# Patient Record
Sex: FEMALE | Race: WHITE | NOT HISPANIC OR LATINO | Employment: OTHER | URBAN - METROPOLITAN AREA
[De-identification: names, ages, dates, MRNs, and addresses within clinical notes are randomized per-mention and may not be internally consistent; named-entity substitution may affect disease eponyms.]

---

## 2017-01-17 DIAGNOSIS — I10 ESSENTIAL HYPERTENSION: ICD-10-CM

## 2017-01-17 RX ORDER — LISINOPRIL 10 MG/1
TABLET ORAL
Qty: 90 TABLET | Refills: 0 | Status: SHIPPED | OUTPATIENT
Start: 2017-01-17 | End: 2017-04-17 | Stop reason: SDUPTHER

## 2017-03-14 ENCOUNTER — TELEPHONE (OUTPATIENT)
Dept: RHEUMATOLOGY | Facility: CLINIC | Age: 82
End: 2017-03-14

## 2017-03-14 DIAGNOSIS — M81.0 OSTEOPOROSIS: Primary | ICD-10-CM

## 2017-03-14 NOTE — TELEPHONE ENCOUNTER
----- Message from Jordin Anthony LPN sent at 10/26/2016  8:46 AM CDT -----  Last prolia- 10.13.16

## 2017-03-31 ENCOUNTER — PATIENT OUTREACH (OUTPATIENT)
Dept: ADMINISTRATIVE | Facility: HOSPITAL | Age: 82
End: 2017-03-31

## 2017-04-13 ENCOUNTER — OFFICE VISIT (OUTPATIENT)
Dept: INTERNAL MEDICINE | Facility: CLINIC | Age: 82
End: 2017-04-13
Payer: MEDICARE

## 2017-04-13 ENCOUNTER — CLINICAL SUPPORT (OUTPATIENT)
Dept: CARDIOLOGY | Facility: CLINIC | Age: 82
End: 2017-04-13
Payer: MEDICARE

## 2017-04-13 VITALS
HEART RATE: 66 BPM | HEIGHT: 58 IN | OXYGEN SATURATION: 98 % | WEIGHT: 133.19 LBS | DIASTOLIC BLOOD PRESSURE: 80 MMHG | BODY MASS INDEX: 27.96 KG/M2 | TEMPERATURE: 96 F | SYSTOLIC BLOOD PRESSURE: 124 MMHG

## 2017-04-13 DIAGNOSIS — I10 ESSENTIAL HYPERTENSION: ICD-10-CM

## 2017-04-13 DIAGNOSIS — K21.9 GASTROESOPHAGEAL REFLUX DISEASE, ESOPHAGITIS PRESENCE NOT SPECIFIED: ICD-10-CM

## 2017-04-13 DIAGNOSIS — M17.0 PRIMARY OSTEOARTHRITIS OF BOTH KNEES: ICD-10-CM

## 2017-04-13 DIAGNOSIS — Z12.11 COLON CANCER SCREENING: ICD-10-CM

## 2017-04-13 DIAGNOSIS — H91.90 HEARING LOSS, UNSPECIFIED HEARING LOSS TYPE, UNSPECIFIED LATERALITY: ICD-10-CM

## 2017-04-13 DIAGNOSIS — M81.0 OSTEOPOROSIS, UNSPECIFIED OSTEOPOROSIS TYPE, UNSPECIFIED PATHOLOGICAL FRACTURE PRESENCE: ICD-10-CM

## 2017-04-13 DIAGNOSIS — Z87.81 HISTORY OF VERTEBRAL COMPRESSION FRACTURE: ICD-10-CM

## 2017-04-13 DIAGNOSIS — N18.30 CKD (CHRONIC KIDNEY DISEASE) STAGE 3, GFR 30-59 ML/MIN: ICD-10-CM

## 2017-04-13 DIAGNOSIS — Z00.00 ROUTINE GENERAL MEDICAL EXAMINATION AT A HEALTH CARE FACILITY: Primary | ICD-10-CM

## 2017-04-13 PROCEDURE — 99499 UNLISTED E&M SERVICE: CPT | Mod: S$GLB,,, | Performed by: FAMILY MEDICINE

## 2017-04-13 PROCEDURE — 99397 PER PM REEVAL EST PAT 65+ YR: CPT | Mod: S$GLB,,, | Performed by: FAMILY MEDICINE

## 2017-04-13 PROCEDURE — 99999 PR PBB SHADOW E&M-EST. PATIENT-LVL III: CPT | Mod: PBBFAC,,, | Performed by: FAMILY MEDICINE

## 2017-04-13 PROCEDURE — 93000 ELECTROCARDIOGRAM COMPLETE: CPT | Mod: S$GLB,,, | Performed by: INTERNAL MEDICINE

## 2017-04-13 RX ORDER — OMEPRAZOLE 40 MG/1
40 CAPSULE, DELAYED RELEASE ORAL DAILY
COMMUNITY
End: 2018-04-03 | Stop reason: SDUPTHER

## 2017-04-13 RX ORDER — SODIUM, POTASSIUM,MAG SULFATES 17.5-3.13G
SOLUTION, RECONSTITUTED, ORAL ORAL
Qty: 354 ML | Refills: 0 | Status: SHIPPED | OUTPATIENT
Start: 2017-04-13 | End: 2018-08-27

## 2017-04-13 NOTE — MR AVS SNAPSHOT
[facility]  [address]  [place] [address]  Phone: [phone]  Fax: [phone]                  Vivienne Sexton   2017 10:40 AM   Office Visit    Description:  Female : 1929   Provider:  Aisha Hickman MD   Department:  [facility]           Reason for Visit     Follow-up           Diagnoses this Visit        Comments    Routine general medical examination at a health care facility    -  Primary     Essential hypertension         CKD (chronic kidney disease) stage 3, GFR 30-59 ml/min         Osteoporosis, unspecified osteoporosis type, unspecified pathological fracture presence         History of vertebral compression fracture         Primary osteoarthritis of both knees         Hearing loss, unspecified hearing loss type, unspecified laterality         Gastroesophageal reflux disease, esophagitis presence not specified         Colon cancer screening                To Do List           Future Appointments        Provider Department Dept Phone    4/15/2017 1:20 PM LABORATORY, [facility] [phone]    4/15/2017 1:30 PM SPECIMEN, [facility] [phone]    2017 10:30 AM LABORATORY, [facility] [phone]    2017 11:00 AM Mark Merritt MD [facility] Rheumatology [phone]    10/13/2017 8:20 AM Aisha Hickman MD [facility] Internal Medicine [phone]      Your Future Surgeries/Procedures     May 03, 2017   Surgery with First Available [place] [facility]  ([facility])    [address]  [place] [address] [phone]              Goals (5 Years of Data)     None      Follow-Up and Disposition     Return in about 6 months (around 10/13/2017), or if symptoms worsen or fail to improve.       These Medications        Disp Refills Start End    sodium,potassium,mag sulfates (SUPREP BOWEL PREP KIT)  17.5-3.13-1.6 gram SolR 354 mL 0 4/13/2017     Take it as directed    Pharmacy: [facility] - [place], [place] - [address] [facility] [address] Ph #: [phone]         [facility]     [facility] Nurse Care Line - 24/7 Assistance  Unless otherwise directed by your provider, please contact [facility], our nurse care line that is available for 24/7 assistance.     Registered nurses in the [facility] provide: appointment scheduling, clinical advisement, health education, and other advisory services.  Call: [phone] (toll free)               Medications           Message regarding Medications     Verify the changes and/or additions to your medication regime listed below are the same as discussed with your clinician today.  If any of these changes or additions are incorrect, please notify your healthcare provider.        START taking these NEW medications        Refills    sodium,potassium,mag sulfates (SUPREP BOWEL PREP KIT) 17.5-3.13-1.6 gram SolR 0    Sig: Take it as directed    Class: Normal           Verify that the below list of medications is an accurate representation of the medications you are currently taking.  If none reported, the list may be blank. If incorrect, please contact your healthcare provider. Carry this list with you in case of emergency.           Current Medications     lisinopril 10 MG tablet TAKE ONE TABLET BY MOUTH ONCE DAILY    MULTIVITS,CA,MINERALS/IRON/FA (ONE-A-DAY WOMENS FORMULA ORAL) Take by mouth once daily.    omeprazole (PRILOSEC) 40 MG capsule Take 40 mg by mouth once daily.    triamterene-hydrochlorothiazide 37.5-25 mg (MAXZIDE-25) 37.5-25 mg per tablet Take 1 tablet by mouth once daily.    turmeric root extract 500 mg Cap Take 1 capsule by mouth 2 (two) times daily.    sodium,potassium,mag sulfates (SUPREP BOWEL PREP KIT) 17.5-3.13-1.6 gram SolR Take it as directed           Clinical Reference Information           Your Vitals Were     BP Pulse  "Temp Height Weight SpO2    124/80 66 96.3 °F (35.7 °C) (Tympanic) 4' 10" (1.473 m) 60.4 kg (133 lb 2.5 oz) 98%    BMI                27.83 kg/m2          Blood Pressure          Most Recent Value    BP  124/80      Allergies as of 4/13/2017     No Known Allergies      Immunizations Administered on Date of Encounter - 4/13/2017     None      Orders Placed During Today's Visit      Normal Orders This Visit    Case request GI: COLONOSCOPY     Future Labs/Procedures Expected by Expires    CBC auto differential  4/13/2017 6/12/2018    Comprehensive metabolic panel  4/13/2017 6/12/2018    Lipid panel  4/13/2017 6/12/2018    TSH  4/13/2017 6/12/2018    Urinalysis  4/13/2017 6/12/2018    Vitamin D  4/13/2017 6/12/2018    EKG 12-lead  As directed 4/13/2018      [facility] Sign-Up     Activating your [facility] account is as easy as 1-2-3!     1) Visit [email or web address], select Sign Up Now, enter this activation code and your date of birth, then select Next.  [email or web address]  Expires: 5/28/2017 11:00 AM      2) Create a username and password to use when you visit [facility] in the future and select a security question in case you lose your password and select Next.    3) Enter your e-mail address and click Sign Up!    Additional Information  If you have questions, please e-mail [email or web address] or call [phone] to talk to our [facility] staff. Remember, [facility] is NOT to be used for urgent needs. For medical emergencies, dial 911.         Language Assistance Services     ATTENTION: Language assistance services are available, free of charge. Please call [phone].      ATENCIÓN: Si habla español, tiene a michelle disposición servicios gratuitos de asistencia lingüística. Llame al [phone].     CHÚ Ý: N?u b?n nói Ti?ng Vi?t, có các d?ch v? h? tr? ngôn ng? mi?n phí dành cho b?n. G?i s? [phone].         [facility] complies with applicable Federal civil rights laws and does not discriminate on the " basis of race, color, national origin, age, disability, or sex.

## 2017-04-15 ENCOUNTER — LAB VISIT (OUTPATIENT)
Dept: LAB | Facility: HOSPITAL | Age: 82
End: 2017-04-15
Attending: FAMILY MEDICINE
Payer: MEDICARE

## 2017-04-15 DIAGNOSIS — M81.0 OSTEOPOROSIS, UNSPECIFIED OSTEOPOROSIS TYPE, UNSPECIFIED PATHOLOGICAL FRACTURE PRESENCE: ICD-10-CM

## 2017-04-15 DIAGNOSIS — N18.30 CKD (CHRONIC KIDNEY DISEASE) STAGE 3, GFR 30-59 ML/MIN: ICD-10-CM

## 2017-04-15 DIAGNOSIS — I10 ESSENTIAL HYPERTENSION: ICD-10-CM

## 2017-04-15 LAB
25(OH)D3+25(OH)D2 SERPL-MCNC: 35 NG/ML
ALBUMIN SERPL BCP-MCNC: 4.1 G/DL
ALP SERPL-CCNC: 40 U/L
ALT SERPL W/O P-5'-P-CCNC: 14 U/L
ANION GAP SERPL CALC-SCNC: 9 MMOL/L
AST SERPL-CCNC: 22 U/L
BASOPHILS # BLD AUTO: 0.03 K/UL
BASOPHILS NFR BLD: 0.7 %
BILIRUB SERPL-MCNC: 0.5 MG/DL
BUN SERPL-MCNC: 49 MG/DL
CALCIUM SERPL-MCNC: 9.8 MG/DL
CHLORIDE SERPL-SCNC: 108 MMOL/L
CHOLEST/HDLC SERPL: 2.8 {RATIO}
CO2 SERPL-SCNC: 23 MMOL/L
CREAT SERPL-MCNC: 1.6 MG/DL
DIFFERENTIAL METHOD: ABNORMAL
EOSINOPHIL # BLD AUTO: 0.1 K/UL
EOSINOPHIL NFR BLD: 2.9 %
ERYTHROCYTE [DISTWIDTH] IN BLOOD BY AUTOMATED COUNT: 13 %
EST. GFR  (AFRICAN AMERICAN): 32.9 ML/MIN/1.73 M^2
EST. GFR  (NON AFRICAN AMERICAN): 28.6 ML/MIN/1.73 M^2
GLUCOSE SERPL-MCNC: 90 MG/DL
HCT VFR BLD AUTO: 35.2 %
HDL/CHOLESTEROL RATIO: 36 %
HDLC SERPL-MCNC: 250 MG/DL
HDLC SERPL-MCNC: 90 MG/DL
HGB BLD-MCNC: 12.1 G/DL
LDLC SERPL CALC-MCNC: 133.6 MG/DL
LYMPHOCYTES # BLD AUTO: 2 K/UL
LYMPHOCYTES NFR BLD: 48.4 %
MCH RBC QN AUTO: 30.1 PG
MCHC RBC AUTO-ENTMCNC: 34.4 %
MCV RBC AUTO: 88 FL
MONOCYTES # BLD AUTO: 0.4 K/UL
MONOCYTES NFR BLD: 10 %
NEUTROPHILS # BLD AUTO: 1.6 K/UL
NEUTROPHILS NFR BLD: 38 %
NONHDLC SERPL-MCNC: 160 MG/DL
PLATELET # BLD AUTO: 241 K/UL
PMV BLD AUTO: 11 FL
POTASSIUM SERPL-SCNC: 4.6 MMOL/L
PROT SERPL-MCNC: 7.6 G/DL
RBC # BLD AUTO: 4.02 M/UL
SODIUM SERPL-SCNC: 140 MMOL/L
TRIGL SERPL-MCNC: 132 MG/DL
TSH SERPL DL<=0.005 MIU/L-ACNC: 1.7 UIU/ML
WBC # BLD AUTO: 4.19 K/UL

## 2017-04-15 PROCEDURE — 82306 VITAMIN D 25 HYDROXY: CPT

## 2017-04-15 PROCEDURE — 80053 COMPREHEN METABOLIC PANEL: CPT

## 2017-04-15 PROCEDURE — 84443 ASSAY THYROID STIM HORMONE: CPT

## 2017-04-15 PROCEDURE — 80061 LIPID PANEL: CPT

## 2017-04-15 PROCEDURE — 36415 COLL VENOUS BLD VENIPUNCTURE: CPT | Mod: PO

## 2017-04-15 PROCEDURE — 85025 COMPLETE CBC W/AUTO DIFF WBC: CPT

## 2017-04-17 ENCOUNTER — TELEPHONE (OUTPATIENT)
Dept: RHEUMATOLOGY | Facility: CLINIC | Age: 82
End: 2017-04-17

## 2017-04-17 DIAGNOSIS — R82.90 ABNORMAL URINE FINDINGS: Primary | ICD-10-CM

## 2017-04-17 DIAGNOSIS — N18.3 CHRONIC KIDNEY DISEASE (CKD), STAGE 3 (MODERATE): ICD-10-CM

## 2017-04-17 DIAGNOSIS — I10 ESSENTIAL HYPERTENSION: ICD-10-CM

## 2017-04-17 RX ORDER — LISINOPRIL 10 MG/1
10 TABLET ORAL DAILY
Qty: 90 TABLET | Refills: 2 | Status: SHIPPED | OUTPATIENT
Start: 2017-04-17 | End: 2018-01-28 | Stop reason: SDUPTHER

## 2017-04-17 NOTE — TELEPHONE ENCOUNTER
----- Message from Michelle Bryant sent at 4/17/2017  8:56 AM CDT -----  Contact: Pt son - kerry   States he is calling rg wanting to resched the appt that pt has and be worked in for next week due to son going out of town and can be reached at [phone]//laya/dbw

## 2017-04-17 NOTE — TELEPHONE ENCOUNTER
Noted white blood cells with clumps in the urine, will plan to repeat urine test again on Friday.   Noted elevated kidney functions, showing chronic kidney disease stage III, will plan to repeat BMP on Friday, orders placed.  Advised to drink adequate fluids  Otherwise stable labs noted

## 2017-04-17 NOTE — TELEPHONE ENCOUNTER
----- Message from Kye Mclain sent at 4/17/2017  1:43 PM CDT -----  Contact: [phone]-[phone]   Pt would like refills called in on Rx medication linsinopril 10 mg.  Please call back @ [phone].  Thanks-[facility]         Pt uses:  [id number]  [place] - [address] [address]  [address] [place] [address]  [place] [address]  Phone: [phone] Fax: [phone]

## 2017-04-18 ENCOUNTER — TELEPHONE (OUTPATIENT)
Dept: INTERNAL MEDICINE | Facility: CLINIC | Age: 82
End: 2017-04-18

## 2017-04-18 NOTE — TELEPHONE ENCOUNTER
Spoke with Julius and moved labs and apt with Dr. JESSICA to 5.2.17 at 2.30 pm and 3 pm. Verbalized understanding.

## 2017-04-18 NOTE — TELEPHONE ENCOUNTER
----- Message from Amarilys Mckeon sent at 4/18/2017  9:40 AM CDT -----  Pt daughter in law(Carine) at [phone]//states she is returning your call from yesterday afternoon//4/17/17//please call again//thanks/wallace

## 2017-04-19 ENCOUNTER — TELEPHONE (OUTPATIENT)
Dept: GASTROENTEROLOGY | Facility: CLINIC | Age: 82
End: 2017-04-19

## 2017-04-19 NOTE — TELEPHONE ENCOUNTER
Pt is scheduled for a colonoscopy on 5/9/2017.  Pt was last seen by  PCP on 4/13/2017. Pt is 89 y/o.  Please review and advise

## 2017-04-20 NOTE — TELEPHONE ENCOUNTER
I believe that due to age, she should not have a repeat colonoscopy.  She had a simple 4mm adenoma in 2011.  I believe that this test should be cancelled.  I believe that when Dr. Levy recommended a recheck, her age was not taken into account.  Unless if Dr. Hickman feels otherwise, I believe that we can cancel the case.  Will you connect with him?  Dr. Meyer

## 2017-05-02 ENCOUNTER — OFFICE VISIT (OUTPATIENT)
Dept: RHEUMATOLOGY | Facility: CLINIC | Age: 82
End: 2017-05-02
Payer: MEDICARE

## 2017-05-02 ENCOUNTER — LAB VISIT (OUTPATIENT)
Dept: LAB | Facility: HOSPITAL | Age: 82
End: 2017-05-02
Attending: INTERNAL MEDICINE
Payer: MEDICARE

## 2017-05-02 VITALS
DIASTOLIC BLOOD PRESSURE: 64 MMHG | WEIGHT: 135.38 LBS | BODY MASS INDEX: 28.29 KG/M2 | HEART RATE: 68 BPM | SYSTOLIC BLOOD PRESSURE: 130 MMHG

## 2017-05-02 DIAGNOSIS — M81.8 OTHER OSTEOPOROSIS WITHOUT CURRENT PATHOLOGICAL FRACTURE: Primary | ICD-10-CM

## 2017-05-02 DIAGNOSIS — M17.0 PRIMARY OSTEOARTHRITIS OF BOTH KNEES: ICD-10-CM

## 2017-05-02 DIAGNOSIS — M81.0 OSTEOPOROSIS: ICD-10-CM

## 2017-05-02 LAB
ALBUMIN SERPL BCP-MCNC: 4 G/DL
ALP SERPL-CCNC: 43 U/L
ALT SERPL W/O P-5'-P-CCNC: 13 U/L
ANION GAP SERPL CALC-SCNC: 7 MMOL/L
AST SERPL-CCNC: 19 U/L
BILIRUB SERPL-MCNC: 0.3 MG/DL
BUN SERPL-MCNC: 38 MG/DL
CALCIUM SERPL-MCNC: 9.9 MG/DL
CHLORIDE SERPL-SCNC: 110 MMOL/L
CO2 SERPL-SCNC: 26 MMOL/L
CREAT SERPL-MCNC: 1.5 MG/DL
EST. GFR  (AFRICAN AMERICAN): 36 ML/MIN/1.73 M^2
EST. GFR  (NON AFRICAN AMERICAN): 31 ML/MIN/1.73 M^2
GLUCOSE SERPL-MCNC: 98 MG/DL
POTASSIUM SERPL-SCNC: 4.7 MMOL/L
PROT SERPL-MCNC: 7.2 G/DL
SODIUM SERPL-SCNC: 143 MMOL/L

## 2017-05-02 PROCEDURE — 99999 PR PBB SHADOW E&M-EST. PATIENT-LVL III: CPT | Mod: PBBFAC,,, | Performed by: INTERNAL MEDICINE

## 2017-05-02 PROCEDURE — 1160F RVW MEDS BY RX/DR IN RCRD: CPT | Mod: S$GLB,,, | Performed by: INTERNAL MEDICINE

## 2017-05-02 PROCEDURE — 96372 THER/PROPH/DIAG INJ SC/IM: CPT | Mod: S$GLB,,, | Performed by: INTERNAL MEDICINE

## 2017-05-02 PROCEDURE — 99499 UNLISTED E&M SERVICE: CPT | Mod: S$GLB,,, | Performed by: INTERNAL MEDICINE

## 2017-05-02 PROCEDURE — 99213 OFFICE O/P EST LOW 20 MIN: CPT | Mod: 25,S$GLB,, | Performed by: INTERNAL MEDICINE

## 2017-05-02 PROCEDURE — 1125F AMNT PAIN NOTED PAIN PRSNT: CPT | Mod: S$GLB,,, | Performed by: INTERNAL MEDICINE

## 2017-05-02 PROCEDURE — 1159F MED LIST DOCD IN RCRD: CPT | Mod: S$GLB,,, | Performed by: INTERNAL MEDICINE

## 2017-05-02 NOTE — PROGRESS NOTES
Administered 1cc Prolia 60mg/cc to LLQ of abdomen. Pt. Tolerated well. No acute reaction noted to site. Pt. Instructed on S/S of reaction to report. Pt. Verbalized understanding.    Lot:[id number]  Exp:06/19  Manu:[email or web address]

## 2017-05-02 NOTE — PROGRESS NOTES
RHEUMATOLOGY CLINIC FOLLOW UP VISIT  Chief complaints:-  To follow up for osteoporosis.     HPI:-  Vivienne Pettit a 88 y.o. pleasant female comes in for a follow up visit with above chief complaints.  She was diagnosed with osteoporosis after compression fractures of her spine status post kyphoplasty .  She was seen in the rheumatology clinic in 2014 by one of my associates and was lost to follow-up.  She has chronic kidney disease. So, she was started on prolia therapy which she is tolerating well without any problems. She denies any jaw pain.  No plans to have dental procedures done in the next few months.  No new fractures. She complains of intermittent pain over her knees- activity related, not associated with swelling/stiffness /injury.     Review of Systems   Constitutional: Positive for malaise/fatigue. Negative for chills, fever and weight loss.   HENT: Positive for hearing loss. Negative for ear discharge, ear pain, nosebleeds and sore throat.    Eyes: Positive for blurred vision. Negative for double vision, photophobia, discharge and redness.   Respiratory: Negative for cough, hemoptysis, sputum production and shortness of breath.    Cardiovascular: Negative for chest pain, palpitations and claudication.   Gastrointestinal: Negative for abdominal pain, constipation, diarrhea, melena, nausea and vomiting.   Genitourinary: Negative for dysuria, frequency, hematuria and urgency.   Musculoskeletal: Positive for back pain, joint pain and neck pain. Negative for falls and myalgias.   Skin: Negative for itching and rash.   Neurological: Negative for dizziness, tremors, sensory change, speech change, focal weakness, seizures, loss of consciousness, weakness and headaches.   Endo/Heme/Allergies: Negative for environmental allergies. Does not bruise/bleed easily.   Psychiatric/Behavioral: Positive for memory loss. Negative for hallucinations. The patient  has insomnia.        Past Medical History:   Diagnosis Date    Arthritis     GERD (gastroesophageal reflux disease)     Hard of hearing     uses hearing aids    Hypertension     Osteoporosis        Past Surgical History:   Procedure Laterality Date    TONSILLECTOMY      TOTAL ABDOMINAL HYSTERECTOMY      VERTEBROPLASTY          Social History   Substance Use Topics    Smoking status: Never Smoker    Smokeless tobacco: Never Used    Alcohol use No       Family History   Problem Relation Age of Onset    Cancer Mother      eye    Kidney disease Father     Cancer Sister      skin, breast    Diabetes Brother     Diabetes Paternal Aunt     Diabetes Paternal Uncle     Psoriasis Neg Hx     Eczema Neg Hx     Lupus Cousin        Review of patient's allergies indicates:  No Known Allergies        Physical examination:-    Vitals:    05/02/17 1443   BP: 130/64   Pulse: 68   Weight: 61.4 kg (135 lb 5.8 oz)   PainSc:   4       Physical Exam   Constitutional: She is oriented to person, place, and time and well-developed, well-nourished, and in no distress. No distress.   HENT:   Head: Normocephalic.   Mouth/Throat: Oropharynx is clear and moist. No oropharyngeal exudate.   Eyes: Conjunctivae and EOM are normal. Pupils are equal, round, and reactive to light.   Neck: Normal range of motion. Neck supple.   Cardiovascular: Normal rate and intact distal pulses.    Pulmonary/Chest: Effort normal. No respiratory distress. She exhibits no tenderness.   Abdominal: Soft. There is no tenderness.   Musculoskeletal:   Multiple Heberden's nodes in both hands without any active synovitis.  Tenderness in the back.  Gait not assessed.  Bilateral knees showed mild tenderness over joint lines- medial>lateral, right > left ,without effusion.  Ankles no effusion and no tenderness.    Lymphadenopathy:     She has no cervical adenopathy.   Neurological: She is alert and oriented to person, place, and time. No cranial nerve deficit.    Skin: Skin is warm. No rash noted. She is not diaphoretic. No erythema.   Psychiatric: Mood and affect normal.   Nursing note and vitals reviewed.      Labs:-  Results for ARELI MAYNARD (MRN [id number]) as of 10/13/2016 12:48   Ref. Range 10/13/2016 11:28   Sodium Latest Ref Range: 136 - 145 mmol/L 143   Potassium Latest Ref Range: 3.5 - 5.1 mmol/L 4.1   Chloride Latest Ref Range: 95 - 110 mmol/L 111 (H)   CO2 Latest Ref Range: 23 - 29 mmol/L 24   Anion Gap Latest Ref Range: 8 - 16 mmol/L 8   BUN, Bld Latest Ref Range: 8 - 23 mg/dL 40 (H)   Creatinine Latest Ref Range: 0.5 - 1.4 mg/dL 1.4   eGFR if non African American Latest Ref Range: >60 mL/min/1.73 m^2 34 (A)   eGFR if African American Latest Ref Range: >60 mL/min/1.73 m^2 39 (A)   Glucose Latest Ref Range: 70 - 110 mg/dL 97   Calcium Latest Ref Range: 8.7 - 10.5 mg/dL 10.1   Alkaline Phosphatase Latest Ref Range: 55 - 135 U/L 36 (L)   Total Protein Latest Ref Range: 6.0 - 8.4 g/dL 7.3   Albumin Latest Ref Range: 3.5 - 5.2 g/dL 4.3   Total Bilirubin Latest Ref Range: 0.1 - 1.0 mg/dL 0.4   AST Latest Ref Range: 10 - 40 U/L 19   ALT Latest Ref Range: 10 - 44 U/L 16     Assessment/Plans:-  # Osteoporosis:-  Severe osteoporosis with spine compression fractures. Continue prolia. CMP 10 days after prolia injection to monitor for hypocalcemia.     # Primary osteoarthritis of both knees  Mild OA. Not significant enough to give steroid injection. If needed, consider in next visit. Continue turmeric root extract .      # RTC in 6 months.     Disclaimer: This note was prepared using voice recognition system and is likely to have sound alike errors and is not proof read.  Please call me with any questions.

## 2017-05-02 NOTE — MR AVS SNAPSHOT
[facility] Rheumatology  [address] [place] [address]  Phone: [phone]  Fax: [phone]                  Vivienne Sexton   2017 3:00 PM   Office Visit    Description:  Female : 1929   Provider:  Mark Merritt MD   Department:  [facility] - Rheumatology           Reason for Visit     Osteoporosis           Diagnoses this Visit        Comments    Other osteoporosis without current pathological fracture    -  Primary     Primary osteoarthritis of both knees                To Do List           Future Appointments        Provider Department Dept Phone    2017 9:50 AM LAB[facility]ATORY, [facility]     10/13/2017 8:20 AM Aisha Hickman MD [facility] Internal Medicine [phone]    2017 9:40 AM [facility] [phone]    2017 10:30 AM Mark Merritt MD [facility] Rheumatology [phone]      Goals (5 Years of Data)     None      Follow-Up and Disposition     Return in about 6 months (around 2017).      [facility]     [facility] Nurse Care Line -  Assistance  Unless otherwise directed by your provider, please contact [facility], our nurse care line that is available for  assistance.     Registered nurses in the [facility] provide: appointment scheduling, clinical advisement, health education, and other advisory services.  Call: [phone] (toll free)               Medications           Message regarding Medications     Verify the changes and/or additions to your medication regime listed below are the same as discussed with your clinician today.  If any of these changes or additions are incorrect, please notify your healthcare provider.             Verify that the below list of medications is an accurate representation of the medications you are currently taking.  If none reported, the list may be blank. If incorrect, please contact your healthcare provider.  Carry this list with you in case of emergency.           Current Medications     lisinopril 10 MG tablet Take 1 tablet (10 mg total) by mouth once daily.    MULTIVITS,CA,MINERALS/IRON/FA (ONE-A-DAY WOMENS FORMULA ORAL) Take by mouth once daily.    omeprazole (PRILOSEC) 40 MG capsule Take 40 mg by mouth once daily.    sodium,potassium,mag sulfates (SUPREP BOWEL PREP KIT) 17.5-3.13-1.6 gram SolR Take it as directed    triamterene-hydrochlorothiazide 37.5-25 mg (MAXZIDE-25) 37.5-25 mg per tablet Take 1 tablet by mouth once daily.    turmeric root extract 500 mg Cap Take 1 capsule by mouth 2 (two) times daily.           Clinical Reference Information           Your Vitals Were     BP Pulse Weight BMI       130/64 68 61.4 kg (135 lb 5.8 oz) 28.29 kg/m2       Blood Pressure          Most Recent Value    BP  130/64      Allergies as of 5/2/2017     No Known Allergies      Immunizations Administered on Date of Encounter - 5/2/2017     None      Orders Placed During Today's Visit      Normal Orders This Visit    Prior Authorization Order       [facility] Sign-Up     Activating your [facility] account is as easy as 1-2-3!     1) Visit [email or web address], select Sign Up Now, enter this activation code and your date of birth, then select Next.  [email or web address]  Expires: 5/28/2017 11:00 AM      2) Create a username and password to use when you visit [facility] in the future and select a security question in case you lose your password and select Next.    3) Enter your e-mail address and click Sign Up!    Additional Information  If you have questions, please e-mail [email or web address] or call [phone] to talk to our [facility] staff. Remember, [facility] is NOT to be used for urgent needs. For medical emergencies, dial 911.         Language Assistance Services     ATTENTION: Language assistance services are available, free of charge. Please call [phone].      ATENCIÓN: Si habla español, tiene a michelle disposición servicios  johnsonos de asistencia lingüística. Shahab ramirez [phone].     BERE Ý: N?u b?n nói Ti?ng Vi?t, có các d?ch v? h? tr? ngôn ng? mi?n phí dành cho b?n. G?i s? [phone].         [facility] complies with applicable Federal civil rights laws and does not discriminate on the basis of race, color, national origin, age, disability, or sex.

## 2017-05-12 ENCOUNTER — LAB VISIT (OUTPATIENT)
Dept: LAB | Facility: HOSPITAL | Age: 82
End: 2017-05-12
Attending: FAMILY MEDICINE
Payer: MEDICARE

## 2017-05-12 DIAGNOSIS — M81.0 OSTEOPOROSIS: ICD-10-CM

## 2017-05-12 LAB
ALBUMIN SERPL BCP-MCNC: 4 G/DL
ALP SERPL-CCNC: 36 U/L
ALT SERPL W/O P-5'-P-CCNC: 13 U/L
ANION GAP SERPL CALC-SCNC: 11 MMOL/L
AST SERPL-CCNC: 18 U/L
BILIRUB SERPL-MCNC: 0.5 MG/DL
BUN SERPL-MCNC: 38 MG/DL
CALCIUM SERPL-MCNC: 10.5 MG/DL
CHLORIDE SERPL-SCNC: 107 MMOL/L
CO2 SERPL-SCNC: 24 MMOL/L
CREAT SERPL-MCNC: 1.4 MG/DL
EST. GFR  (AFRICAN AMERICAN): 38.7 ML/MIN/1.73 M^2
EST. GFR  (NON AFRICAN AMERICAN): 33.6 ML/MIN/1.73 M^2
GLUCOSE SERPL-MCNC: 115 MG/DL
POTASSIUM SERPL-SCNC: 4.1 MMOL/L
PROT SERPL-MCNC: 7.4 G/DL
SODIUM SERPL-SCNC: 142 MMOL/L

## 2017-05-12 PROCEDURE — 80053 COMPREHEN METABOLIC PANEL: CPT

## 2017-05-12 PROCEDURE — 36415 COLL VENOUS BLD VENIPUNCTURE: CPT | Mod: PO

## 2017-05-15 ENCOUNTER — TELEPHONE (OUTPATIENT)
Dept: RHEUMATOLOGY | Facility: CLINIC | Age: 82
End: 2017-05-15

## 2017-05-15 NOTE — TELEPHONE ENCOUNTER
----- Message from Tamara Chavez sent at 5/15/2017  4:28 PM CDT -----  Call pt dg in law danii at [phone]//returning your call//devyn cano

## 2017-06-22 ENCOUNTER — TELEPHONE (OUTPATIENT)
Dept: INTERNAL MEDICINE | Facility: CLINIC | Age: 82
End: 2017-06-22

## 2017-06-22 ENCOUNTER — TELEPHONE (OUTPATIENT)
Dept: RHEUMATOLOGY | Facility: CLINIC | Age: 82
End: 2017-06-22

## 2017-06-22 NOTE — TELEPHONE ENCOUNTER
----- Message from Marilyn Ramírez LPN sent at 6/22/2017  2:45 PM CDT -----      ----- Message -----  From: Amisha Nielsen  Sent: 6/22/2017   2:42 PM  To: V[facility] Staff    4/15 results results needed....[phone] (home)

## 2017-06-22 NOTE — TELEPHONE ENCOUNTER
----- Message from Justino Reagan sent at 6/22/2017  2:56 PM CDT -----  Pt is requesting a call from nurse in regards to a personal concern.            Please call pt back at [phone]

## 2017-06-23 NOTE — TELEPHONE ENCOUNTER
Mildly decreased kidney functions noted , but otherwise stable labs .Drink adequate fluids. If having any urinary symptoms , please return to the clinic, as they were few white blood cells noted, encouraged to drink adequate fluids

## 2017-09-05 ENCOUNTER — TELEPHONE (OUTPATIENT)
Dept: INTERNAL MEDICINE | Facility: CLINIC | Age: 82
End: 2017-09-05

## 2017-09-05 DIAGNOSIS — Z12.39 BREAST CANCER SCREENING: Primary | ICD-10-CM

## 2017-09-05 NOTE — TELEPHONE ENCOUNTER
----- Message from Kye Mclain sent at 9/5/2017  1:28 PM CDT -----  Contact: [phone]   Pt would like to consult with the nurse about lump in breast, pt would like an order for a mammogram, please call pt when mammogram order is ready to be scheduled.  Please call back at [phone].  Thx-[facility]

## 2017-09-13 ENCOUNTER — HOSPITAL ENCOUNTER (OUTPATIENT)
Dept: RADIOLOGY | Facility: HOSPITAL | Age: 82
Discharge: HOME | End: 2017-09-13
Attending: FAMILY MEDICINE
Payer: MEDICARE

## 2017-09-13 VITALS — BODY MASS INDEX: 28.34 KG/M2 | HEIGHT: 58 IN | WEIGHT: 135 LBS

## 2017-09-13 DIAGNOSIS — Z12.39 BREAST CANCER SCREENING: ICD-10-CM

## 2017-09-13 PROCEDURE — 77067 SCR MAMMO BI INCL CAD: CPT | Mod: TC

## 2017-09-13 PROCEDURE — 77067 SCR MAMMO BI INCL CAD: CPT | Mod: 26,,, | Performed by: RADIOLOGY

## 2017-11-09 DIAGNOSIS — I10 ESSENTIAL HYPERTENSION: ICD-10-CM

## 2017-11-09 RX ORDER — TRIAMTERENE/HYDROCHLOROTHIAZID 37.5-25 MG
1 TABLET ORAL DAILY
Qty: 90 TABLET | Refills: 1 | Status: SHIPPED | OUTPATIENT
Start: 2017-11-09 | End: 2018-05-17 | Stop reason: SDUPTHER

## 2017-11-13 ENCOUNTER — TELEPHONE (OUTPATIENT)
Dept: RHEUMATOLOGY | Facility: CLINIC | Age: 82
End: 2017-11-13

## 2017-11-14 ENCOUNTER — OFFICE VISIT (OUTPATIENT)
Dept: RHEUMATOLOGY | Facility: CLINIC | Age: 82
End: 2017-11-14
Payer: MEDICARE

## 2017-11-14 ENCOUNTER — LAB VISIT (OUTPATIENT)
Dept: LAB | Facility: HOSPITAL | Age: 82
End: 2017-11-14
Attending: INTERNAL MEDICINE
Payer: MEDICARE

## 2017-11-14 VITALS
SYSTOLIC BLOOD PRESSURE: 121 MMHG | DIASTOLIC BLOOD PRESSURE: 69 MMHG | BODY MASS INDEX: 28.69 KG/M2 | HEIGHT: 58 IN | WEIGHT: 136.69 LBS | HEART RATE: 69 BPM

## 2017-11-14 DIAGNOSIS — M81.0 OSTEOPOROSIS: ICD-10-CM

## 2017-11-14 DIAGNOSIS — M17.0 PRIMARY OSTEOARTHRITIS OF BOTH KNEES: ICD-10-CM

## 2017-11-14 DIAGNOSIS — M81.0 AGE-RELATED OSTEOPOROSIS WITHOUT CURRENT PATHOLOGICAL FRACTURE: Primary | ICD-10-CM

## 2017-11-14 LAB
ALBUMIN SERPL BCP-MCNC: 3.9 G/DL
ALP SERPL-CCNC: 39 U/L
ALT SERPL W/O P-5'-P-CCNC: 11 U/L
ANION GAP SERPL CALC-SCNC: 8 MMOL/L
AST SERPL-CCNC: 16 U/L
BILIRUB SERPL-MCNC: 0.3 MG/DL
BUN SERPL-MCNC: 45 MG/DL
CALCIUM SERPL-MCNC: 10.1 MG/DL
CHLORIDE SERPL-SCNC: 111 MMOL/L
CO2 SERPL-SCNC: 25 MMOL/L
CREAT SERPL-MCNC: 1.6 MG/DL
EST. GFR  (AFRICAN AMERICAN): 33 ML/MIN/1.73 M^2
EST. GFR  (NON AFRICAN AMERICAN): 29 ML/MIN/1.73 M^2
GLUCOSE SERPL-MCNC: 101 MG/DL
POTASSIUM SERPL-SCNC: 4.4 MMOL/L
PROT SERPL-MCNC: 7.4 G/DL
SODIUM SERPL-SCNC: 144 MMOL/L

## 2017-11-14 PROCEDURE — 90662 IIV NO PRSV INCREASED AG IM: CPT | Mod: S$GLB,,, | Performed by: INTERNAL MEDICINE

## 2017-11-14 PROCEDURE — 80053 COMPREHEN METABOLIC PANEL: CPT | Mod: PO

## 2017-11-14 PROCEDURE — 99214 OFFICE O/P EST MOD 30 MIN: CPT | Mod: 25,S$GLB,, | Performed by: INTERNAL MEDICINE

## 2017-11-14 PROCEDURE — 99499 UNLISTED E&M SERVICE: CPT | Mod: S$GLB,,, | Performed by: INTERNAL MEDICINE

## 2017-11-14 PROCEDURE — G0008 ADMIN INFLUENZA VIRUS VAC: HCPCS | Mod: S$GLB,,, | Performed by: INTERNAL MEDICINE

## 2017-11-14 PROCEDURE — 99999 PR PBB SHADOW E&M-EST. PATIENT-LVL III: CPT | Mod: PBBFAC,,, | Performed by: INTERNAL MEDICINE

## 2017-11-14 PROCEDURE — 96372 THER/PROPH/DIAG INJ SC/IM: CPT | Mod: 59,S$GLB,, | Performed by: INTERNAL MEDICINE

## 2017-11-14 PROCEDURE — 36415 COLL VENOUS BLD VENIPUNCTURE: CPT | Mod: PO

## 2017-11-14 NOTE — PROGRESS NOTES
RHEUMATOLOGY CLINIC FOLLOW UP VISIT  Chief complaints:-  To follow up for osteoporosis.     HPI:-  Vivienne Pettit a 88 y.o. pleasant female comes in for a follow up visit with above chief complaints.  She was diagnosed with osteoporosis after compression fractures of her spine status post kyphoplasty .  She was seen in the rheumatology clinic in 2014 by one of my associates and was lost to follow-up.  She has chronic kidney disease. So, she was started on prolia therapy which she is tolerating well without any problems. She denies any jaw pain.  No plans to have dental procedures done in the next few months.  No new fractures. She complains of intermittent pain over her knees- activity related, not associated with swelling/stiffness /injury.     Review of Systems   Constitutional: Positive for malaise/fatigue. Negative for chills, fever and weight loss.   HENT: Positive for hearing loss. Negative for ear discharge, ear pain, nosebleeds and sore throat.    Eyes: Positive for blurred vision. Negative for double vision, photophobia, discharge and redness.   Respiratory: Negative for cough, hemoptysis, sputum production and shortness of breath.    Cardiovascular: Negative for chest pain, palpitations and claudication.   Gastrointestinal: Negative for abdominal pain, constipation, diarrhea, melena, nausea and vomiting.   Genitourinary: Negative for dysuria, frequency, hematuria and urgency.   Musculoskeletal: Positive for back pain, joint pain and neck pain. Negative for falls and myalgias.   Skin: Negative for itching and rash.   Neurological: Negative for dizziness, tremors, sensory change, speech change, focal weakness, seizures, loss of consciousness, weakness and headaches.   Endo/Heme/Allergies: Negative for environmental allergies. Does not bruise/bleed easily.   Psychiatric/Behavioral: Positive for memory loss. Negative for hallucinations. The patient  "has insomnia.        Past Medical History:   Diagnosis Date    Arthritis     GERD (gastroesophageal reflux disease)     Hard of hearing     uses hearing aids    Hypertension     Osteoporosis        Past Surgical History:   Procedure Laterality Date    BREAST BIOPSY      not sure of side and when/ benign    OOPHORECTOMY      TONSILLECTOMY      TOTAL ABDOMINAL HYSTERECTOMY      when she was in her 40's    VERTEBROPLASTY          Social History   Substance Use Topics    Smoking status: Never Smoker    Smokeless tobacco: Never Used    Alcohol use No       Family History   Problem Relation Age of Onset    Cancer Mother      eye    Kidney disease Father     Cancer Sister      skin, breast    Diabetes Brother     Diabetes Paternal Aunt     Diabetes Paternal Uncle     Lupus Cousin     Psoriasis Neg Hx     Eczema Neg Hx        Review of patient's allergies indicates:  No Known Allergies        Physical examination:-    Vitals:    11/14/17 1013   BP: 121/69   Pulse: 69   Weight: 62 kg (136 lb 11 oz)   Height: 4' 10" (1.473 m)   PainSc:   4   PainLoc: Knee       Physical Exam   Constitutional: She is oriented to person, place, and time and well-developed, well-nourished, and in no distress. No distress.   HENT:   Head: Normocephalic.   Mouth/Throat: Oropharynx is clear and moist. No oropharyngeal exudate.   Eyes: Conjunctivae and EOM are normal. Pupils are equal, round, and reactive to light.   Neck: Normal range of motion. Neck supple.   Cardiovascular: Normal rate and intact distal pulses.    Pulmonary/Chest: Effort normal. No respiratory distress. She exhibits no tenderness.   Abdominal: Soft. There is no tenderness.   Musculoskeletal:   Multiple Heberden's nodes in both hands without any active synovitis.  Tenderness in the back.  Gait not assessed.  Bilateral knees showed mild tenderness over joint lines- medial>lateral, right > left ,without effusion.  Ankles no effusion and no tenderness.  "   Lymphadenopathy:     She has no cervical adenopathy.   Neurological: She is alert and oriented to person, place, and time. No cranial nerve deficit.   Skin: Skin is warm. No rash noted. She is not diaphoretic. No erythema.   Psychiatric: Mood and affect normal.   Nursing note and vitals reviewed.    Assessment/Plans:-  # Osteoporosis:-  Severe osteoporosis with spine compression fractures. Continue prolia. CMP 10 days after prolia injection to monitor for hypocalcemia.     # Primary osteoarthritis of both knees  Mild OA. Not significant enough to give steroid injection. If needed, consider in next visit. Continue turmeric root extract .      # RTC in 6 months.     Disclaimer: This note was prepared using voice recognition system and is likely to have sound alike errors and is not proof read.  Please call me with any questions.

## 2017-11-14 NOTE — PROGRESS NOTES
Administered 1 cc Prolia 60mg/cc  to RUQ of abdomen. Pt tolerated well. No acute reaction noted to site. Pt instructed on S/S to report. Advised patient to wait in lobby 15 minutes after receiving injection to monitor for any reactions. Pt verbalized understanding.     Lot: [id number]  Exp: 09/19

## 2017-11-24 ENCOUNTER — LAB VISIT (OUTPATIENT)
Dept: LAB | Facility: HOSPITAL | Age: 82
End: 2017-11-24
Attending: INTERNAL MEDICINE
Payer: MEDICARE

## 2017-11-24 DIAGNOSIS — M81.0 OSTEOPOROSIS: ICD-10-CM

## 2017-11-24 LAB
ALBUMIN SERPL BCP-MCNC: 3.9 G/DL
ALP SERPL-CCNC: 40 U/L
ALT SERPL W/O P-5'-P-CCNC: 12 U/L
ANION GAP SERPL CALC-SCNC: 10 MMOL/L
AST SERPL-CCNC: 18 U/L
BILIRUB SERPL-MCNC: 0.4 MG/DL
BUN SERPL-MCNC: 47 MG/DL
CALCIUM SERPL-MCNC: 10.3 MG/DL
CHLORIDE SERPL-SCNC: 112 MMOL/L
CO2 SERPL-SCNC: 20 MMOL/L
CREAT SERPL-MCNC: 1.6 MG/DL
EST. GFR  (AFRICAN AMERICAN): 32.9 ML/MIN/1.73 M^2
EST. GFR  (NON AFRICAN AMERICAN): 28.6 ML/MIN/1.73 M^2
GLUCOSE SERPL-MCNC: 118 MG/DL
POTASSIUM SERPL-SCNC: 4.4 MMOL/L
PROT SERPL-MCNC: 7.5 G/DL
SODIUM SERPL-SCNC: 142 MMOL/L

## 2017-11-24 PROCEDURE — 36415 COLL VENOUS BLD VENIPUNCTURE: CPT | Mod: PO

## 2017-11-24 PROCEDURE — 80053 COMPREHEN METABOLIC PANEL: CPT

## 2018-01-28 DIAGNOSIS — I10 ESSENTIAL HYPERTENSION: ICD-10-CM

## 2018-01-28 RX ORDER — LISINOPRIL 10 MG/1
TABLET ORAL
Qty: 90 TABLET | Refills: 0 | Status: SHIPPED | OUTPATIENT
Start: 2018-01-28 | End: 2018-05-03 | Stop reason: SDUPTHER

## 2018-04-03 ENCOUNTER — OFFICE VISIT (OUTPATIENT)
Dept: FAMILY MEDICINE | Facility: CLINIC | Age: 83
End: 2018-04-03
Payer: MEDICARE

## 2018-04-03 VITALS
SYSTOLIC BLOOD PRESSURE: 124 MMHG | HEART RATE: 87 BPM | TEMPERATURE: 98 F | BODY MASS INDEX: 27.66 KG/M2 | WEIGHT: 131.75 LBS | OXYGEN SATURATION: 97 % | HEIGHT: 58 IN | DIASTOLIC BLOOD PRESSURE: 76 MMHG

## 2018-04-03 DIAGNOSIS — L73.9 FOLLICULITIS: Primary | ICD-10-CM

## 2018-04-03 PROCEDURE — 99999 PR PBB SHADOW E&M-EST. PATIENT-LVL III: CPT | Mod: PBBFAC,,, | Performed by: FAMILY MEDICINE

## 2018-04-03 PROCEDURE — 99213 OFFICE O/P EST LOW 20 MIN: CPT | Mod: S$GLB,,, | Performed by: FAMILY MEDICINE

## 2018-04-03 RX ORDER — CEPHALEXIN 500 MG/1
500 CAPSULE ORAL EVERY 6 HOURS
Qty: 28 CAPSULE | Refills: 0 | Status: SHIPPED | OUTPATIENT
Start: 2018-04-03 | End: 2018-08-27

## 2018-04-03 RX ORDER — MUPIROCIN 20 MG/G
OINTMENT TOPICAL 2 TIMES DAILY
Qty: 22 G | Refills: 0 | Status: SHIPPED | OUTPATIENT
Start: 2018-04-03 | End: 2019-03-11

## 2018-04-03 RX ORDER — OMEPRAZOLE 40 MG/1
40 CAPSULE, DELAYED RELEASE ORAL DAILY
Qty: 90 CAPSULE | Refills: 0 | Status: SHIPPED | OUTPATIENT
Start: 2018-04-03 | End: 2018-06-24 | Stop reason: SDUPTHER

## 2018-04-03 NOTE — PROGRESS NOTES
Subjective:       Patient ID: Vivienne Sexton is a 89 y.o. female.    Chief Complaint: Sore (private area)    89 y old female with lesion on labia majora here to discuss recommendations  . She  Noted  it yesterday . Exteremly tender . Non sexually active       Review of Systems   Constitutional: Negative.    HENT: Negative.    Respiratory: Negative.    Cardiovascular: Negative.    Gastrointestinal: Negative.    Genitourinary: Positive for genital sores.   Musculoskeletal: Negative.        Objective:      Physical Exam   Constitutional: She is oriented to person, place, and time. She appears well-developed and well-nourished. No distress.   HENT:   Head: Normocephalic and atraumatic.   Right Ear: External ear normal.   Left Ear: External ear normal.   Mouth/Throat: No oropharyngeal exudate.   Eyes: Conjunctivae and EOM are normal. Pupils are equal, round, and reactive to light. Right eye exhibits no discharge. Left eye exhibits no discharge. No scleral icterus.   Neck: Normal range of motion. Neck supple. No JVD present. No tracheal deviation present. No thyromegaly present.   Cardiovascular: Normal rate, regular rhythm and normal heart sounds.  Exam reveals no gallop and no friction rub.    No murmur heard.  Pulmonary/Chest: Effort normal and breath sounds normal. No stridor. No respiratory distress. She has no wheezes. She has no rales. She exhibits no tenderness.   Abdominal: Soft. Bowel sounds are normal. She exhibits no distension. There is no tenderness. There is no rebound and no guarding.   Genitourinary:       There is lesion on the right labia.   Genitourinary Comments: 4 mm in diam non fluctuant erythematous  Nodule  Oozing  Minimal purulent discharge     Musculoskeletal: Normal range of motion.   Lymphadenopathy:     She has no cervical adenopathy.   Neurological: She is alert and oriented to person, place, and time.   Skin: Skin is warm and dry. She is not diaphoretic.   Psychiatric: She has a normal  mood and affect. Her behavior is normal. Judgment and thought content normal.       Assessment:      Vivienne was seen today for sore.    Diagnoses and all orders for this visit:    Folliculitis    Other orders  -     omeprazole (PRILOSEC) 40 MG capsule; Take 1 capsule (40 mg total) by mouth once daily.  -     mupirocin (BACTROBAN) 2 % ointment; Apply topically 2 (two) times daily.  -     cephALEXin (KEFLEX) 500 MG capsule; Take 1 capsule (500 mg total) by mouth every 6 (six) hours.      Plan:   Mable discussed. F.u in 1 w

## 2018-04-05 ENCOUNTER — TELEPHONE (OUTPATIENT)
Dept: RHEUMATOLOGY | Facility: CLINIC | Age: 83
End: 2018-04-05

## 2018-04-05 DIAGNOSIS — M81.8 OTHER OSTEOPOROSIS WITHOUT CURRENT PATHOLOGICAL FRACTURE: Primary | ICD-10-CM

## 2018-04-05 NOTE — TELEPHONE ENCOUNTER
----- Message from Jordin Anthony LPN sent at 11/14/2017 10:56 AM CST -----  Last prolia- 11.14.17

## 2018-04-09 ENCOUNTER — PATIENT OUTREACH (OUTPATIENT)
Dept: ADMINISTRATIVE | Facility: HOSPITAL | Age: 83
End: 2018-04-09

## 2018-04-10 ENCOUNTER — OFFICE VISIT (OUTPATIENT)
Dept: FAMILY MEDICINE | Facility: CLINIC | Age: 83
End: 2018-04-10
Payer: MEDICARE

## 2018-04-10 VITALS
DIASTOLIC BLOOD PRESSURE: 72 MMHG | BODY MASS INDEX: 27.12 KG/M2 | SYSTOLIC BLOOD PRESSURE: 126 MMHG | OXYGEN SATURATION: 97 % | TEMPERATURE: 98 F | HEART RATE: 90 BPM | HEIGHT: 58 IN | WEIGHT: 129.19 LBS

## 2018-04-10 DIAGNOSIS — L73.9 FOLLICULITIS: Primary | ICD-10-CM

## 2018-04-10 PROCEDURE — 99999 PR PBB SHADOW E&M-EST. PATIENT-LVL III: CPT | Mod: PBBFAC,,, | Performed by: FAMILY MEDICINE

## 2018-04-10 PROCEDURE — 99213 OFFICE O/P EST LOW 20 MIN: CPT | Mod: S$GLB,,, | Performed by: FAMILY MEDICINE

## 2018-04-10 NOTE — PROGRESS NOTES
Subjective:       Patient ID: Vivienne Sexton is a 89 y.o. female.    Chief Complaint: Follow-up    89 y old female here for f.u on R Labia majora folliculitis . Finished a course of Cephalexin , no longer bother by lesion       Review of Systems   Constitutional: Negative.    Respiratory: Negative.    Cardiovascular: Negative.    Genitourinary: Negative.    Musculoskeletal: Negative.        Objective:      Physical Exam   Constitutional: She is oriented to person, place, and time. She appears well-developed and well-nourished. No distress.   HENT:   Head: Normocephalic and atraumatic.   Right Ear: External ear normal.   Left Ear: External ear normal.   Mouth/Throat: No oropharyngeal exudate.   Eyes: Conjunctivae and EOM are normal. Pupils are equal, round, and reactive to light. Right eye exhibits no discharge. Left eye exhibits no discharge. No scleral icterus.   Neck: Normal range of motion. Neck supple. No JVD present. No tracheal deviation present. No thyromegaly present.   Cardiovascular: Normal rate, regular rhythm and normal heart sounds.  Exam reveals no gallop and no friction rub.    No murmur heard.  Pulmonary/Chest: Effort normal and breath sounds normal. No stridor. No respiratory distress. She has no wheezes. She has no rales. She exhibits no tenderness.   Abdominal: Soft. Bowel sounds are normal. She exhibits no distension. There is no tenderness. There is no rebound and no guarding.   Musculoskeletal: Normal range of motion.   Lymphadenopathy:     She has no cervical adenopathy.   Neurological: She is alert and oriented to person, place, and time.   Skin: Skin is warm and dry. She is not diaphoretic.   Psychiatric: She has a normal mood and affect. Her behavior is normal. Judgment and thought content normal.       Assessment:     Folliculitis  -     CBC auto differential; Future; Expected date: 04/10/2018  -     Comprehensive metabolic panel; Future; Expected date: 04/10/2018  -     Hemoglobin  A1c; Future; Expected date: 04/10/2018  -     Lipid panel; Future; Expected date: 04/10/2018        Plan:     Vivienne was seen today for follow-up.    Diagnoses and all orders for this visit:    Hypertension, unspecified type  -     CBC auto differential; Future  -     Comprehensive metabolic panel; Future  -     Hemoglobin A1c; Future  -     Lipid panel; Future     Resolved, Dany 1-2 d after labs

## 2018-05-03 DIAGNOSIS — I10 ESSENTIAL HYPERTENSION: ICD-10-CM

## 2018-05-03 RX ORDER — LISINOPRIL 10 MG/1
TABLET ORAL
Qty: 30 TABLET | Refills: 0 | Status: SHIPPED | OUTPATIENT
Start: 2018-05-03 | End: 2018-06-04 | Stop reason: SDUPTHER

## 2018-05-14 ENCOUNTER — LAB VISIT (OUTPATIENT)
Dept: LAB | Facility: HOSPITAL | Age: 83
End: 2018-05-14
Attending: FAMILY MEDICINE
Payer: MEDICARE

## 2018-05-14 DIAGNOSIS — M81.8 OTHER OSTEOPOROSIS WITHOUT CURRENT PATHOLOGICAL FRACTURE: ICD-10-CM

## 2018-05-14 LAB
ALBUMIN SERPL BCP-MCNC: 4.2 G/DL
ALP SERPL-CCNC: 30 U/L
ALT SERPL W/O P-5'-P-CCNC: 11 U/L
ANION GAP SERPL CALC-SCNC: 9 MMOL/L
AST SERPL-CCNC: 20 U/L
BILIRUB SERPL-MCNC: 0.4 MG/DL
BUN SERPL-MCNC: 51 MG/DL
CALCIUM SERPL-MCNC: 10 MG/DL
CHLORIDE SERPL-SCNC: 109 MMOL/L
CO2 SERPL-SCNC: 21 MMOL/L
CREAT SERPL-MCNC: 1.6 MG/DL
EST. GFR  (AFRICAN AMERICAN): 32.7 ML/MIN/1.73 M^2
EST. GFR  (NON AFRICAN AMERICAN): 28.4 ML/MIN/1.73 M^2
GLUCOSE SERPL-MCNC: 86 MG/DL
POTASSIUM SERPL-SCNC: 4.9 MMOL/L
PROT SERPL-MCNC: 7.2 G/DL
SODIUM SERPL-SCNC: 139 MMOL/L

## 2018-05-14 PROCEDURE — 80053 COMPREHEN METABOLIC PANEL: CPT

## 2018-05-14 PROCEDURE — 36415 COLL VENOUS BLD VENIPUNCTURE: CPT | Mod: PO

## 2018-05-17 DIAGNOSIS — I10 ESSENTIAL HYPERTENSION: ICD-10-CM

## 2018-05-17 RX ORDER — TRIAMTERENE/HYDROCHLOROTHIAZID 37.5-25 MG
TABLET ORAL
Qty: 30 TABLET | Refills: 0 | Status: SHIPPED | OUTPATIENT
Start: 2018-05-17 | End: 2018-06-24 | Stop reason: SDUPTHER

## 2018-06-04 DIAGNOSIS — I10 ESSENTIAL HYPERTENSION: ICD-10-CM

## 2018-06-04 RX ORDER — LISINOPRIL 10 MG/1
TABLET ORAL
Qty: 30 TABLET | Refills: 0 | Status: SHIPPED | OUTPATIENT
Start: 2018-06-04 | End: 2018-07-05 | Stop reason: SDUPTHER

## 2018-06-22 ENCOUNTER — TELEPHONE (OUTPATIENT)
Dept: RHEUMATOLOGY | Facility: CLINIC | Age: 83
End: 2018-06-22
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
## 2018-06-24 DIAGNOSIS — I10 ESSENTIAL HYPERTENSION: ICD-10-CM

## 2018-06-25 RX ORDER — OMEPRAZOLE 40 MG/1
CAPSULE, DELAYED RELEASE ORAL
Qty: 90 CAPSULE | Refills: 0 | Status: SHIPPED | OUTPATIENT
Start: 2018-06-25 | End: 2018-08-27 | Stop reason: SDUPTHER

## 2018-06-25 RX ORDER — TRIAMTERENE/HYDROCHLOROTHIAZID 37.5-25 MG
TABLET ORAL
Qty: 30 TABLET | Refills: 0 | Status: SHIPPED | OUTPATIENT
Start: 2018-06-25 | End: 2018-07-26 | Stop reason: SDUPTHER

## 2018-07-05 DIAGNOSIS — I10 ESSENTIAL HYPERTENSION: ICD-10-CM

## 2018-07-05 RX ORDER — LISINOPRIL 10 MG/1
TABLET ORAL
Qty: 30 TABLET | Refills: 0 | Status: SHIPPED | OUTPATIENT
Start: 2018-07-05 | End: 2018-07-10 | Stop reason: SDUPTHER

## 2018-07-10 DIAGNOSIS — I10 ESSENTIAL HYPERTENSION: ICD-10-CM

## 2018-07-10 RX ORDER — LISINOPRIL 10 MG/1
10 TABLET ORAL DAILY
Qty: 30 TABLET | Refills: 0 | Status: SHIPPED | OUTPATIENT
Start: 2018-07-10 | End: 2018-08-27 | Stop reason: SDUPTHER

## 2018-07-26 DIAGNOSIS — I10 ESSENTIAL HYPERTENSION: ICD-10-CM

## 2018-07-26 RX ORDER — TRIAMTERENE/HYDROCHLOROTHIAZID 37.5-25 MG
TABLET ORAL
Qty: 30 TABLET | Refills: 0 | Status: SHIPPED | OUTPATIENT
Start: 2018-07-26 | End: 2018-08-27 | Stop reason: SDUPTHER

## 2018-08-27 ENCOUNTER — OFFICE VISIT (OUTPATIENT)
Dept: FAMILY MEDICINE | Facility: CLINIC | Age: 83
End: 2018-08-27
Payer: MEDICARE

## 2018-08-27 VITALS
SYSTOLIC BLOOD PRESSURE: 124 MMHG | BODY MASS INDEX: 26.17 KG/M2 | DIASTOLIC BLOOD PRESSURE: 68 MMHG | WEIGHT: 124.69 LBS | TEMPERATURE: 96 F | OXYGEN SATURATION: 98 % | HEIGHT: 58 IN | HEART RATE: 88 BPM

## 2018-08-27 DIAGNOSIS — M81.0 OSTEOPOROSIS, UNSPECIFIED OSTEOPOROSIS TYPE, UNSPECIFIED PATHOLOGICAL FRACTURE PRESENCE: ICD-10-CM

## 2018-08-27 DIAGNOSIS — R79.9 ABNORMAL FINDING OF BLOOD CHEMISTRY: ICD-10-CM

## 2018-08-27 DIAGNOSIS — E78.5 DYSLIPIDEMIA: Primary | ICD-10-CM

## 2018-08-27 DIAGNOSIS — I10 ESSENTIAL HYPERTENSION: ICD-10-CM

## 2018-08-27 PROCEDURE — 99499 UNLISTED E&M SERVICE: CPT | Mod: S$GLB,,, | Performed by: FAMILY MEDICINE

## 2018-08-27 PROCEDURE — 90471 IMMUNIZATION ADMIN: CPT | Mod: PBBFAC,PO

## 2018-08-27 PROCEDURE — 99214 OFFICE O/P EST MOD 30 MIN: CPT | Mod: 25,S$PBB,, | Performed by: FAMILY MEDICINE

## 2018-08-27 PROCEDURE — 99999 PR PBB SHADOW E&M-EST. PATIENT-LVL III: CPT | Mod: PBBFAC,,, | Performed by: FAMILY MEDICINE

## 2018-08-27 RX ORDER — TRIAMTERENE/HYDROCHLOROTHIAZID 37.5-25 MG
1 TABLET ORAL DAILY
Qty: 90 TABLET | Refills: 0 | Status: SHIPPED | OUTPATIENT
Start: 2018-08-27 | End: 2018-12-12 | Stop reason: SDUPTHER

## 2018-08-27 RX ORDER — LISINOPRIL 10 MG/1
10 TABLET ORAL DAILY
Qty: 90 TABLET | Refills: 0 | Status: SHIPPED | OUTPATIENT
Start: 2018-08-27 | End: 2018-12-26 | Stop reason: SDUPTHER

## 2018-08-27 RX ORDER — OMEPRAZOLE 40 MG/1
40 CAPSULE, DELAYED RELEASE ORAL DAILY
Qty: 90 CAPSULE | Refills: 0 | Status: SHIPPED | OUTPATIENT
Start: 2018-08-27 | End: 2019-01-05 | Stop reason: SDUPTHER
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
## 2018-09-18 ENCOUNTER — LAB VISIT (OUTPATIENT)
Dept: LAB | Facility: HOSPITAL | Age: 83
End: 2018-09-18
Attending: FAMILY MEDICINE
Payer: MEDICARE

## 2018-09-18 DIAGNOSIS — E78.5 DYSLIPIDEMIA: ICD-10-CM

## 2018-09-18 DIAGNOSIS — I10 ESSENTIAL HYPERTENSION: ICD-10-CM

## 2018-09-18 DIAGNOSIS — R79.9 ABNORMAL FINDING OF BLOOD CHEMISTRY: ICD-10-CM

## 2018-09-18 LAB
ALBUMIN SERPL BCP-MCNC: 4 G/DL
ALP SERPL-CCNC: 44 U/L
ALT SERPL W/O P-5'-P-CCNC: 12 U/L
ANION GAP SERPL CALC-SCNC: 9 MMOL/L
AST SERPL-CCNC: 19 U/L
BASOPHILS # BLD AUTO: 0.04 K/UL
BASOPHILS NFR BLD: 1 %
BILIRUB SERPL-MCNC: 0.5 MG/DL
BUN SERPL-MCNC: 54 MG/DL
CALCIUM SERPL-MCNC: 10 MG/DL
CHLORIDE SERPL-SCNC: 111 MMOL/L
CHOLEST SERPL-MCNC: 265 MG/DL
CHOLEST/HDLC SERPL: 3 {RATIO}
CO2 SERPL-SCNC: 22 MMOL/L
CREAT SERPL-MCNC: 1.5 MG/DL
DIFFERENTIAL METHOD: ABNORMAL
EOSINOPHIL # BLD AUTO: 0.2 K/UL
EOSINOPHIL NFR BLD: 3.9 %
ERYTHROCYTE [DISTWIDTH] IN BLOOD BY AUTOMATED COUNT: 12.9 %
EST. GFR  (AFRICAN AMERICAN): 35.4 ML/MIN/1.73 M^2
EST. GFR  (NON AFRICAN AMERICAN): 30.7 ML/MIN/1.73 M^2
GLUCOSE SERPL-MCNC: 89 MG/DL
HCT VFR BLD AUTO: 35.8 %
HDLC SERPL-MCNC: 89 MG/DL
HDLC SERPL: 33.6 %
HGB BLD-MCNC: 11.4 G/DL
IMM GRANULOCYTES # BLD AUTO: 0.01 K/UL
IMM GRANULOCYTES NFR BLD AUTO: 0.2 %
LDLC SERPL CALC-MCNC: 146.4 MG/DL
LYMPHOCYTES # BLD AUTO: 2.1 K/UL
LYMPHOCYTES NFR BLD: 49.4 %
MCH RBC QN AUTO: 29.4 PG
MCHC RBC AUTO-ENTMCNC: 31.8 G/DL
MCV RBC AUTO: 92 FL
MONOCYTES # BLD AUTO: 0.4 K/UL
MONOCYTES NFR BLD: 8.9 %
NEUTROPHILS # BLD AUTO: 1.5 K/UL
NEUTROPHILS NFR BLD: 36.6 %
NONHDLC SERPL-MCNC: 176 MG/DL
NRBC BLD-RTO: 0 /100 WBC
PLATELET # BLD AUTO: 248 K/UL
PMV BLD AUTO: 11.6 FL
POTASSIUM SERPL-SCNC: 4.3 MMOL/L
PROT SERPL-MCNC: 7.2 G/DL
RBC # BLD AUTO: 3.88 M/UL
SODIUM SERPL-SCNC: 142 MMOL/L
TRIGL SERPL-MCNC: 148 MG/DL
WBC # BLD AUTO: 4.15 K/UL

## 2018-09-18 PROCEDURE — 85025 COMPLETE CBC W/AUTO DIFF WBC: CPT

## 2018-09-18 PROCEDURE — 36415 COLL VENOUS BLD VENIPUNCTURE: CPT | Mod: PO

## 2018-09-18 PROCEDURE — 80061 LIPID PANEL: CPT

## 2018-09-18 PROCEDURE — 83036 HEMOGLOBIN GLYCOSYLATED A1C: CPT

## 2018-09-18 PROCEDURE — 80053 COMPREHEN METABOLIC PANEL: CPT

## 2018-09-19 LAB
ESTIMATED AVG GLUCOSE: 103 MG/DL
HBA1C MFR BLD HPLC: 5.2 %

## 2018-10-15 ENCOUNTER — OFFICE VISIT (OUTPATIENT)
Dept: OPHTHALMOLOGY | Facility: CLINIC | Age: 83
End: 2018-10-15
Payer: MEDICARE

## 2018-10-15 DIAGNOSIS — G43.109 MIGRAINE AURA WITHOUT HEADACHE: Primary | ICD-10-CM

## 2018-10-15 DIAGNOSIS — H52.4 BILATERAL PRESBYOPIA: ICD-10-CM

## 2018-10-15 DIAGNOSIS — Z96.1 PSEUDOPHAKIA OF BOTH EYES: ICD-10-CM

## 2018-10-15 PROCEDURE — 99999 PR PBB SHADOW E&M-EST. PATIENT-LVL II: CPT | Mod: PBBFAC,,, | Performed by: OPTOMETRIST

## 2018-10-15 PROCEDURE — 99212 OFFICE O/P EST SF 10 MIN: CPT | Mod: PBBFAC | Performed by: OPTOMETRIST

## 2018-10-15 PROCEDURE — 92015 DETERMINE REFRACTIVE STATE: CPT | Mod: ,,, | Performed by: OPTOMETRIST

## 2018-10-15 PROCEDURE — 92004 COMPRE OPH EXAM NEW PT 1/>: CPT | Mod: S$PBB,,, | Performed by: OPTOMETRIST

## 2018-10-15 NOTE — PROGRESS NOTES
HPI     Sharp pain in left eye that went away  Patient states she sees a big ball of color in both eyes,  Sometimes is a small section and sometimes it's big.  Seeing red, green, colors.  New patient not sure when last eye exam.    Last edited by Anton Dick, OD on 10/15/2018  3:18 PM. (History)            Assessment /Plan     For exam results, see Encounter Report.    Migraine aura without headache    Pseudophakia of both eyes    Bilateral presbyopia      Migraine aura/colored lights lasting less than 30 minutes several times per day for the last 4-5 weeks.  Mild optic nerve pallor consistent with age, no edema noted.    Consult neurology if desired, okay to cancel neurology appointment if glasses improve the situation.    Stable IOL OU.    Dispense Final Rx for glasses.  RTC 1 year  Discussed above and answered questions.

## 2018-10-15 NOTE — PATIENT INSTRUCTIONS
Migraine aura without headache     Pseudophakia of both eyes     Bilateral presbyopia        Migraine aura/colored lights lasting less than 30 minutes several times per day for the last 4-5 weeks.  Mild optic nerve pallor consistent with age, no edema noted.     Consult neurology if desired, okay to cancel neurology appointment if glasses improve the situation.     Stable IOL OU.     Dispense Final Rx for glasses.  RTC 1 year  Discussed above and answered questions.

## 2018-12-12 DIAGNOSIS — I10 ESSENTIAL HYPERTENSION: ICD-10-CM

## 2018-12-12 RX ORDER — TRIAMTERENE/HYDROCHLOROTHIAZID 37.5-25 MG
1 TABLET ORAL DAILY
Qty: 90 TABLET | Refills: 0 | Status: SHIPPED | OUTPATIENT
Start: 2018-12-12 | End: 2018-12-13 | Stop reason: SDUPTHER

## 2018-12-13 DIAGNOSIS — I10 ESSENTIAL HYPERTENSION: ICD-10-CM

## 2018-12-13 RX ORDER — TRIAMTERENE/HYDROCHLOROTHIAZID 37.5-25 MG
1 TABLET ORAL DAILY
Qty: 90 TABLET | Refills: 0 | Status: SHIPPED | OUTPATIENT
Start: 2018-12-13 | End: 2019-06-27 | Stop reason: SDUPTHER

## 2018-12-26 DIAGNOSIS — I10 ESSENTIAL HYPERTENSION: ICD-10-CM

## 2018-12-26 RX ORDER — LISINOPRIL 10 MG/1
TABLET ORAL
Qty: 90 TABLET | Refills: 0 | Status: SHIPPED | OUTPATIENT
Start: 2018-12-26 | End: 2019-04-18 | Stop reason: SDUPTHER

## 2019-01-07 RX ORDER — OMEPRAZOLE 40 MG/1
CAPSULE, DELAYED RELEASE ORAL
Qty: 90 CAPSULE | Refills: 0 | Status: SHIPPED | OUTPATIENT
Start: 2019-01-07 | End: 2019-04-27 | Stop reason: SDUPTHER

## 2019-03-11 ENCOUNTER — OFFICE VISIT (OUTPATIENT)
Dept: FAMILY MEDICINE | Facility: CLINIC | Age: 84
End: 2019-03-11
Attending: FAMILY MEDICINE
Payer: MEDICARE

## 2019-03-11 ENCOUNTER — HOSPITAL ENCOUNTER (OUTPATIENT)
Dept: RADIOLOGY | Facility: HOSPITAL | Age: 84
Discharge: HOME | End: 2019-03-11
Attending: FAMILY MEDICINE
Payer: MEDICARE

## 2019-03-11 VITALS
HEIGHT: 58 IN | DIASTOLIC BLOOD PRESSURE: 70 MMHG | TEMPERATURE: 97 F | BODY MASS INDEX: 27 KG/M2 | SYSTOLIC BLOOD PRESSURE: 130 MMHG | OXYGEN SATURATION: 97 % | WEIGHT: 128.63 LBS | HEART RATE: 85 BPM

## 2019-03-11 DIAGNOSIS — N18.30 CKD (CHRONIC KIDNEY DISEASE) STAGE 3, GFR 30-59 ML/MIN: ICD-10-CM

## 2019-03-11 DIAGNOSIS — I10 HYPERTENSION, UNSPECIFIED TYPE: ICD-10-CM

## 2019-03-11 DIAGNOSIS — R07.9 CHEST PAIN, UNSPECIFIED TYPE: Primary | ICD-10-CM

## 2019-03-11 DIAGNOSIS — E78.5 DYSLIPIDEMIA: ICD-10-CM

## 2019-03-11 DIAGNOSIS — R07.9 CHEST PAIN, UNSPECIFIED TYPE: ICD-10-CM

## 2019-03-11 PROCEDURE — 1101F PT FALLS ASSESS-DOCD LE1/YR: CPT | Mod: CPTII,S$GLB,, | Performed by: FAMILY MEDICINE

## 2019-03-11 PROCEDURE — 71046 X-RAY EXAM CHEST 2 VIEWS: CPT | Mod: 26,,, | Performed by: RADIOLOGY

## 2019-03-11 PROCEDURE — 93005 EKG 12-LEAD: ICD-10-PCS | Mod: S$GLB,,, | Performed by: FAMILY MEDICINE

## 2019-03-11 PROCEDURE — 99499 RISK ADDL DX/OHS AUDIT: ICD-10-PCS | Mod: S$GLB,,, | Performed by: FAMILY MEDICINE

## 2019-03-11 PROCEDURE — 99999 PR PBB SHADOW E&M-EST. PATIENT-LVL III: ICD-10-PCS | Mod: PBBFAC,,, | Performed by: FAMILY MEDICINE

## 2019-03-11 PROCEDURE — 99214 OFFICE O/P EST MOD 30 MIN: CPT | Mod: S$GLB,,, | Performed by: FAMILY MEDICINE

## 2019-03-11 PROCEDURE — 99499 UNLISTED E&M SERVICE: CPT | Mod: S$GLB,,, | Performed by: FAMILY MEDICINE

## 2019-03-11 PROCEDURE — 99214 PR OFFICE/OUTPT VISIT, EST, LEVL IV, 30-39 MIN: ICD-10-PCS | Mod: S$GLB,,, | Performed by: FAMILY MEDICINE

## 2019-03-11 PROCEDURE — 71046 X-RAY EXAM CHEST 2 VIEWS: CPT | Mod: TC,PO

## 2019-03-11 PROCEDURE — 93010 EKG 12-LEAD: ICD-10-PCS | Mod: S$GLB,,, | Performed by: INTERNAL MEDICINE

## 2019-03-11 PROCEDURE — 1101F PR PT FALLS ASSESS DOC 0-1 FALLS W/OUT INJ PAST YR: ICD-10-PCS | Mod: CPTII,S$GLB,, | Performed by: FAMILY MEDICINE

## 2019-03-11 PROCEDURE — 93005 ELECTROCARDIOGRAM TRACING: CPT | Mod: S$GLB,,, | Performed by: FAMILY MEDICINE

## 2019-03-11 PROCEDURE — 93010 ELECTROCARDIOGRAM REPORT: CPT | Mod: S$GLB,,, | Performed by: INTERNAL MEDICINE

## 2019-03-11 PROCEDURE — 71046 XR CHEST PA AND LATERAL: ICD-10-PCS | Mod: 26,,, | Performed by: RADIOLOGY

## 2019-03-11 PROCEDURE — 99999 PR PBB SHADOW E&M-EST. PATIENT-LVL III: CPT | Mod: PBBFAC,,, | Performed by: FAMILY MEDICINE

## 2019-03-11 NOTE — PROGRESS NOTES
Subjective:       Patient ID: Vivienne Sexton is a 89 y.o. female.    Chief Complaint: Cough (productive)    89 y old female with stage 3 ckd, dlp and HTN here to discuss an  episode CP while seating on chair last week . Lasted Less than  5 min. It has not recur  . She has been coughing up for 3 w .this last symptoms has  Improved  Some .       Review of Systems   Constitutional: Negative.    HENT: Negative.    Eyes: Negative.    Respiratory: Negative.    Cardiovascular: Negative.    Gastrointestinal: Negative.    Genitourinary: Negative.    Musculoskeletal: Negative.    Skin: Negative.    Hematological: Negative.        Objective:      Physical Exam   Constitutional: She is oriented to person, place, and time. She appears well-developed and well-nourished. No distress.   HENT:   Head: Normocephalic and atraumatic.   Right Ear: External ear normal.   Left Ear: External ear normal.   Mouth/Throat: No oropharyngeal exudate.   Eyes: Conjunctivae and EOM are normal. Pupils are equal, round, and reactive to light. Right eye exhibits no discharge. Left eye exhibits no discharge. No scleral icterus.   Neck: Normal range of motion. Neck supple. No JVD present. No tracheal deviation present. No thyromegaly present.   Cardiovascular: Normal rate, regular rhythm and normal heart sounds. Exam reveals no gallop and no friction rub.   No murmur heard.  Pulmonary/Chest: Effort normal and breath sounds normal. No stridor. No respiratory distress. She has no wheezes. She has no rales. She exhibits no tenderness.   Abdominal: Soft. Bowel sounds are normal. She exhibits no distension. There is no tenderness. There is no rebound and no guarding.   Musculoskeletal: Normal range of motion.   Lymphadenopathy:     She has no cervical adenopathy.   Neurological: She is alert and oriented to person, place, and time.   Skin: Skin is warm and dry. She is not diaphoretic.   Psychiatric: She has a normal mood and affect. Her behavior is  normal. Judgment and thought content normal.       Assessment:       Vivienne was seen today for cough.    Diagnoses and all orders for this visit:    Chest pain, unspecified type  -     IN OFFICE EKG 12-LEAD (to Muse)  -     X-Ray Chest PA And Lateral; Future    Hypertension, unspecified type    CKD (chronic kidney disease) stage 3, GFR 30-59 ml/min    Dyslipidemia      Plan:   We will continue to f.u   L axis deviation noted on EKG ( not new)

## 2019-04-18 DIAGNOSIS — I10 ESSENTIAL HYPERTENSION: ICD-10-CM

## 2019-04-18 RX ORDER — LISINOPRIL 10 MG/1
TABLET ORAL
Qty: 30 TABLET | Refills: 0 | Status: SHIPPED | OUTPATIENT
Start: 2019-04-18 | End: 2019-05-17 | Stop reason: SDUPTHER

## 2019-04-29 RX ORDER — OMEPRAZOLE 40 MG/1
CAPSULE, DELAYED RELEASE ORAL
Qty: 90 CAPSULE | Refills: 0 | Status: SHIPPED | OUTPATIENT
Start: 2019-04-29 | End: 2019-08-06 | Stop reason: SDUPTHER

## 2019-05-17 DIAGNOSIS — I10 ESSENTIAL HYPERTENSION: ICD-10-CM

## 2019-05-20 RX ORDER — LISINOPRIL 10 MG/1
TABLET ORAL
Qty: 30 TABLET | Refills: 0 | Status: SHIPPED | OUTPATIENT
Start: 2019-05-20 | End: 2019-06-20 | Stop reason: SDUPTHER

## 2019-06-20 DIAGNOSIS — I10 ESSENTIAL HYPERTENSION: ICD-10-CM

## 2019-06-20 RX ORDER — LISINOPRIL 10 MG/1
TABLET ORAL
Qty: 30 TABLET | Refills: 0 | Status: SHIPPED | OUTPATIENT
Start: 2019-06-20 | End: 2019-08-19 | Stop reason: SDUPTHER

## 2019-06-27 DIAGNOSIS — I10 ESSENTIAL HYPERTENSION: ICD-10-CM

## 2019-07-02 RX ORDER — TRIAMTERENE/HYDROCHLOROTHIAZID 37.5-25 MG
1 TABLET ORAL DAILY
Qty: 90 TABLET | Refills: 0 | Status: SHIPPED | OUTPATIENT
Start: 2019-07-02 | End: 2019-08-06 | Stop reason: SDUPTHER

## 2019-08-06 DIAGNOSIS — I10 ESSENTIAL HYPERTENSION: ICD-10-CM

## 2019-08-06 RX ORDER — OMEPRAZOLE 40 MG/1
CAPSULE, DELAYED RELEASE ORAL
Qty: 90 CAPSULE | Refills: 0 | Status: SHIPPED | OUTPATIENT
Start: 2019-08-06

## 2019-08-06 RX ORDER — TRIAMTERENE/HYDROCHLOROTHIAZID 37.5-25 MG
1 TABLET ORAL DAILY
Qty: 90 TABLET | Refills: 0 | Status: SHIPPED | OUTPATIENT
Start: 2019-08-06 | End: 2019-09-10 | Stop reason: SDUPTHER

## 2019-08-19 ENCOUNTER — TELEPHONE (OUTPATIENT)
Dept: FAMILY MEDICINE | Facility: CLINIC | Age: 84
End: 2019-08-19

## 2019-08-19 ENCOUNTER — PATIENT OUTREACH (OUTPATIENT)
Dept: ADMINISTRATIVE | Facility: HOSPITAL | Age: 84
End: 2019-08-19

## 2019-08-19 DIAGNOSIS — I10 ESSENTIAL HYPERTENSION: ICD-10-CM

## 2019-08-19 RX ORDER — LISINOPRIL 10 MG/1
10 TABLET ORAL DAILY
Qty: 30 TABLET | Refills: 0 | Status: SHIPPED | OUTPATIENT
Start: 2019-08-19 | End: 2019-09-10 | Stop reason: SDUPTHER

## 2019-08-19 NOTE — Clinical Note
[facility] please see med request.LISINOPRIL 10MG 1 TAB DAILY. LAST OV:3/11/19 NO FUTURE APPT. LAST REFILLED: 6/20/19 30 TABLETS WITH NO REFILLS. PLEASE CONTACT PT. Ms Sexton only has a 30 day Rx no refills in EPIC for Lisinopril 10mg daily.Please ask Dr Wright if ok to prescribe 90 day Rx with refills.   Her last fill was 6/20/19 x 30 days.I did leave her a [facility] on 8/8 asking her to refill but it wasn't.

## 2019-08-19 NOTE — TELEPHONE ENCOUNTER
Appempted to contact pt to schedule F/U appt per Dr Weathers. Unable to reach pt. LVM on home phone and cell.

## 2019-08-19 NOTE — LETTER
August 23, 2019    Vivienne Sexton  [address] [place] [address]             [facility]  [address]  [place] [address]  Phone: [phone]  Fax: [phone] Dear Mrs. Sexton:    Your insurance company [employer] has contacted Dr Weathers on your behalf requesting a 90 day Rx with refills on Lisinopril 10mg 1 tablet daily.    Dr Weathers is requesting a follow up visit. We have been trying to contact you but have been unable to reach you.                Please contact us by calling [phone] to schedule your needed follow up appointment.    Thank you for choosing [facility] for your healthcare needs.      Tori HAAS LPN  Care Coordination Department  [facility]  Phone Number: [phone]        If you have any questions or concerns, please don't hesitate to call.    Sincerely,        Tori Manzano LPN

## 2019-08-19 NOTE — TELEPHONE ENCOUNTER
----- Message from Viola Wolf MA sent at 8/19/2019  1:19 PM CDT -----      ----- Message -----  From: Tori Manzano LPN  Sent: 8/19/2019  12:44 PM  To: [facility] Staff    PER [facility]: DR MARIN please see med request.LISINOPRIL 10MG 1 TAB DAILY. LAST OV:3/11/19 NO FUTURE APPT. LAST REFILLED: 6/20/19 30 TABLETS WITH NO REFILLS. PLEASE CONTACT PT.   Ms Sexton only has a 30 day Rx no refills in EPIC for Lisinopril 10mg daily.  Please ask Dr Wright if ok to prescribe 90 day Rx with refills.   Her last fill was 6/20/19 x 30 days.  I did leave her a sg on 8/8 asking her to refill but it wasn't.

## 2019-08-20 NOTE — TELEPHONE ENCOUNTER
Attempted to contact pt to schedule f/u appt per Dr Weathers. Unable to reach pt. LVM on both lines.

## 2019-09-10 ENCOUNTER — TELEPHONE (OUTPATIENT)
Dept: FAMILY MEDICINE | Facility: CLINIC | Age: 84
End: 2019-09-10

## 2019-09-10 ENCOUNTER — OFFICE VISIT (OUTPATIENT)
Dept: FAMILY MEDICINE | Facility: CLINIC | Age: 84
End: 2019-09-10
Attending: FAMILY MEDICINE
Payer: MEDICARE

## 2019-09-10 VITALS
HEART RATE: 83 BPM | DIASTOLIC BLOOD PRESSURE: 68 MMHG | SYSTOLIC BLOOD PRESSURE: 138 MMHG | WEIGHT: 129.88 LBS | HEIGHT: 58 IN | TEMPERATURE: 98 F | BODY MASS INDEX: 27.26 KG/M2 | OXYGEN SATURATION: 97 %

## 2019-09-10 DIAGNOSIS — M81.0 OSTEOPOROSIS, UNSPECIFIED OSTEOPOROSIS TYPE, UNSPECIFIED PATHOLOGICAL FRACTURE PRESENCE: ICD-10-CM

## 2019-09-10 DIAGNOSIS — G47.00 INSOMNIA, UNSPECIFIED TYPE: ICD-10-CM

## 2019-09-10 DIAGNOSIS — I10 HYPERTENSION, UNSPECIFIED TYPE: Primary | ICD-10-CM

## 2019-09-10 DIAGNOSIS — N18.30 CKD (CHRONIC KIDNEY DISEASE) STAGE 3, GFR 30-59 ML/MIN: ICD-10-CM

## 2019-09-10 DIAGNOSIS — I10 ESSENTIAL HYPERTENSION: ICD-10-CM

## 2019-09-10 DIAGNOSIS — R79.9 ABNORMAL FINDING OF BLOOD CHEMISTRY: ICD-10-CM

## 2019-09-10 DIAGNOSIS — Z12.11 COLON CANCER SCREENING: ICD-10-CM

## 2019-09-10 DIAGNOSIS — E78.5 DYSLIPIDEMIA: ICD-10-CM

## 2019-09-10 PROCEDURE — 99999 PR PBB SHADOW E&M-EST. PATIENT-LVL III: CPT | Mod: PBBFAC,,, | Performed by: FAMILY MEDICINE

## 2019-09-10 PROCEDURE — 1100F PR PT FALLS ASSESS DOC 2+ FALLS/FALL W/INJURY/YR: ICD-10-PCS | Mod: CPTII,S$GLB,, | Performed by: FAMILY MEDICINE

## 2019-09-10 PROCEDURE — 99499 UNLISTED E&M SERVICE: CPT | Mod: S$GLB,,, | Performed by: FAMILY MEDICINE

## 2019-09-10 PROCEDURE — 3288F FALL RISK ASSESSMENT DOCD: CPT | Mod: CPTII,S$GLB,, | Performed by: FAMILY MEDICINE

## 2019-09-10 PROCEDURE — 99214 OFFICE O/P EST MOD 30 MIN: CPT | Mod: 25,S$GLB,, | Performed by: FAMILY MEDICINE

## 2019-09-10 PROCEDURE — 99214 PR OFFICE/OUTPT VISIT, EST, LEVL IV, 30-39 MIN: ICD-10-PCS | Mod: 25,S$GLB,, | Performed by: FAMILY MEDICINE

## 2019-09-10 PROCEDURE — 99499 RISK ADDL DX/OHS AUDIT: ICD-10-PCS | Mod: S$GLB,,, | Performed by: FAMILY MEDICINE

## 2019-09-10 PROCEDURE — G0008 ADMIN INFLUENZA VIRUS VAC: HCPCS | Mod: S$GLB,,, | Performed by: FAMILY MEDICINE

## 2019-09-10 PROCEDURE — 3288F PR FALLS RISK ASSESSMENT DOCUMENTED: ICD-10-PCS | Mod: CPTII,S$GLB,, | Performed by: FAMILY MEDICINE

## 2019-09-10 PROCEDURE — 1100F PTFALLS ASSESS-DOCD GE2>/YR: CPT | Mod: CPTII,S$GLB,, | Performed by: FAMILY MEDICINE

## 2019-09-10 PROCEDURE — 90662 FLU VACCINE - HIGH DOSE (65+) PRESERVATIVE FREE IM: ICD-10-PCS | Mod: S$GLB,,, | Performed by: FAMILY MEDICINE

## 2019-09-10 PROCEDURE — G0008 FLU VACCINE - HIGH DOSE (65+) PRESERVATIVE FREE IM: ICD-10-PCS | Mod: S$GLB,,, | Performed by: FAMILY MEDICINE

## 2019-09-10 PROCEDURE — 90662 IIV NO PRSV INCREASED AG IM: CPT | Mod: S$GLB,,, | Performed by: FAMILY MEDICINE

## 2019-09-10 PROCEDURE — 99999 PR PBB SHADOW E&M-EST. PATIENT-LVL III: ICD-10-PCS | Mod: PBBFAC,,, | Performed by: FAMILY MEDICINE

## 2019-09-10 RX ORDER — MIRTAZAPINE 7.5 MG/1
7.5 TABLET, FILM COATED ORAL NIGHTLY
Qty: 30 TABLET | Refills: 0 | Status: SHIPPED | OUTPATIENT
Start: 2019-09-10 | End: 2020-09-09

## 2019-09-10 RX ORDER — CALCIUM CARBONATE/VITAMIN D3 600MG-5MCG
1 TABLET ORAL DAILY
COMMUNITY

## 2019-09-10 RX ORDER — TRIAMTERENE/HYDROCHLOROTHIAZID 37.5-25 MG
1 TABLET ORAL DAILY
Qty: 90 TABLET | Refills: 4 | Status: SHIPPED | OUTPATIENT
Start: 2019-09-10

## 2019-09-10 RX ORDER — LISINOPRIL 10 MG/1
10 TABLET ORAL DAILY
Qty: 90 TABLET | Refills: 4 | Status: SHIPPED | OUTPATIENT
Start: 2019-09-10

## 2019-09-10 NOTE — PROGRESS NOTES
Subjective:       Patient ID: Vviienne Sexton is a 89 y.o. female.    Chief Complaint: Trouble Sleeping and Medication Refill    89 y old female with oporosis , stage 3 ckd, htn and dlp here for f.u . Doing well , lives with her son . No recent falls. Some insomnia . Denies triggers ,stressors etc . Stays active . Eats a healthful diet. She has been unable to keep f.u saige with rheum due too transportation but now that son lives with her , she wishes to  reschedule saige       Review of Systems   Constitutional: Negative.    HENT: Negative.    Eyes: Negative.    Respiratory: Negative.    Cardiovascular: Negative.    Gastrointestinal: Negative.    Genitourinary: Negative.    Musculoskeletal: Negative.    Skin: Negative.    Hematological: Negative.    Psychiatric/Behavioral: Positive for sleep disturbance.       Objective:      Physical Exam   Constitutional: She is oriented to person, place, and time. No distress.   HENT:   Head: Normocephalic and atraumatic.   Right Ear: External ear normal.   Left Ear: External ear normal.   Mouth/Throat: No oropharyngeal exudate.   Eyes: Pupils are equal, round, and reactive to light. Conjunctivae and EOM are normal. Right eye exhibits no discharge. Left eye exhibits no discharge. No scleral icterus.   Neck: Normal range of motion. Neck supple. No JVD present. No tracheal deviation present. No thyromegaly present.   Cardiovascular: Normal rate, regular rhythm and normal heart sounds. Exam reveals no gallop and no friction rub.   No murmur heard.  Pulmonary/Chest: Effort normal and breath sounds normal. No stridor. No respiratory distress. She has no wheezes. She has no rales. She exhibits no tenderness.   Abdominal: Soft. Bowel sounds are normal. She exhibits no distension. There is no tenderness. There is no rebound and no guarding.   Musculoskeletal: Normal range of motion.   Lymphadenopathy:     She has no cervical adenopathy.   Neurological: She is alert and oriented to  person, place, and time.   Skin: Skin is warm and dry. She is not diaphoretic.   Psychiatric: She has a normal mood and affect. Her behavior is normal. Judgment and thought content normal.       Assessment:     Vivienne was seen today for trouble sleeping and medication refill.    Diagnoses and all orders for this visit:    Hypertension, unspecified type  -     CBC auto differential; Future  -     Comprehensive metabolic panel; Future  -     Hemoglobin A1c; Future  -     Lipid panel; Future    Essential hypertension  -     lisinopril 10 MG tablet; Take 1 tablet (10 mg total) by mouth once daily.  -     triamterene-hydrochlorothiazide 37.5-25 mg (MAXZIDE-25) 37.5-25 mg per tablet; Take 1 tablet by mouth once daily.  -     CBC auto differential; Future  -     Comprehensive metabolic panel; Future  -     Hemoglobin A1c; Future  -     Lipid panel; Future    Dyslipidemia  -     CBC auto differential; Future  -     Comprehensive metabolic panel; Future  -     Hemoglobin A1c; Future  -     Lipid panel; Future    Colon cancer screening  -     Case request GI: COLONOSCOPY    Abnormal finding of blood chemistry   -     Hemoglobin A1c; Future    Osteoporosis, unspecified osteoporosis type, unspecified pathological fracture presence    CKD (chronic kidney disease) stage 3, GFR 30-59 ml/min    Insomnia, unspecified type    Other orders  -     mirtazapine (REMERON) 7.5 MG Tab; Take 1 tablet (7.5 mg total) by mouth every evening.  -     Influenza - High Dose (65+) (PF) (IM)          Plan:     Vivienne was seen today for trouble sleeping and medication refill.    Diagnoses and all orders for this visit:    Hypertension, unspecified type  -     CBC auto differential; Future  -     Comprehensive metabolic panel; Future  -     Hemoglobin A1c; Future  -     Lipid panel; Future    Essential hypertension  -     lisinopril 10 MG tablet; Take 1 tablet (10 mg total) by mouth once daily.  -     triamterene-hydrochlorothiazide 37.5-25 mg  (MAXZIDE-25) 37.5-25 mg per tablet; Take 1 tablet by mouth once daily.  -     CBC auto differential; Future  -     Comprehensive metabolic panel; Future  -     Hemoglobin A1c; Future  -     Lipid panel; Future    Dyslipidemia  -     CBC auto differential; Future  -     Comprehensive metabolic panel; Future  -     Hemoglobin A1c; Future  -     Lipid panel; Future    Colon cancer screening  -     Case request GI: COLONOSCOPY    Abnormal finding of blood chemistry   -     Hemoglobin A1c; Future    controlled. Cont med   Labs The patient is asked to make an attempt to improve diet and exercise patterns to aid in medical management of this problem.   Rheum   Avoid NSAIDS   Trial of remeron . F.u in 1 m

## 2019-09-10 NOTE — TELEPHONE ENCOUNTER
----- Message from Lorrie Dawn sent at 9/10/2019 11:12 AM CDT -----  Contact: Pt Son   Pt Son is calling giving notice that they are running 15 mins late to appt. .[phone] (home)         .Thank You  Lorrie Dawn

## 2019-09-11 ENCOUNTER — TELEPHONE (OUTPATIENT)
Dept: ENDOSCOPY | Facility: HOSPITAL | Age: 84
End: 2019-09-11

## 2019-09-20 ENCOUNTER — LAB VISIT (OUTPATIENT)
Dept: LAB | Facility: HOSPITAL | Age: 84
End: 2019-09-20
Attending: FAMILY MEDICINE
Payer: MEDICARE

## 2019-09-20 DIAGNOSIS — I10 ESSENTIAL HYPERTENSION: ICD-10-CM

## 2019-09-20 DIAGNOSIS — R79.9 ABNORMAL FINDING OF BLOOD CHEMISTRY: ICD-10-CM

## 2019-09-20 DIAGNOSIS — E78.5 DYSLIPIDEMIA: ICD-10-CM

## 2019-09-20 DIAGNOSIS — I10 HYPERTENSION, UNSPECIFIED TYPE: ICD-10-CM

## 2019-09-20 LAB
ALBUMIN SERPL BCP-MCNC: 4.2 G/DL (ref 3.5–5.2)
ALP SERPL-CCNC: 60 U/L (ref 55–135)
ALT SERPL W/O P-5'-P-CCNC: 9 U/L (ref 10–44)
ANION GAP SERPL CALC-SCNC: 11 MMOL/L (ref 8–16)
AST SERPL-CCNC: 17 U/L (ref 10–40)
BASOPHILS # BLD AUTO: 0.06 K/UL (ref 0–0.2)
BASOPHILS NFR BLD: 1.3 % (ref 0–1.9)
BILIRUB SERPL-MCNC: 0.5 MG/DL (ref 0.1–1)
BUN SERPL-MCNC: 42 MG/DL (ref 8–23)
CALCIUM SERPL-MCNC: 10.4 MG/DL (ref 8.7–10.5)
CHLORIDE SERPL-SCNC: 110 MMOL/L (ref 95–110)
CHOLEST SERPL-MCNC: 246 MG/DL (ref 120–199)
CHOLEST/HDLC SERPL: 2.5 {RATIO} (ref 2–5)
CO2 SERPL-SCNC: 21 MMOL/L (ref 23–29)
CREAT SERPL-MCNC: 1.4 MG/DL (ref 0.5–1.4)
DIFFERENTIAL METHOD: ABNORMAL
EOSINOPHIL # BLD AUTO: 0.2 K/UL (ref 0–0.5)
EOSINOPHIL NFR BLD: 3.3 % (ref 0–8)
ERYTHROCYTE [DISTWIDTH] IN BLOOD BY AUTOMATED COUNT: 12.1 % (ref 11.5–14.5)
EST. GFR  (AFRICAN AMERICAN): 38.2 ML/MIN/1.73 M^2
EST. GFR  (NON AFRICAN AMERICAN): 33.1 ML/MIN/1.73 M^2
ESTIMATED AVG GLUCOSE: 100 MG/DL (ref 68–131)
GLUCOSE SERPL-MCNC: 89 MG/DL (ref 70–110)
HBA1C MFR BLD HPLC: 5.1 % (ref 4–5.6)
HCT VFR BLD AUTO: 36.3 % (ref 37–48.5)
HDLC SERPL-MCNC: 98 MG/DL (ref 40–75)
HDLC SERPL: 39.8 % (ref 20–50)
HGB BLD-MCNC: 11.5 G/DL (ref 12–16)
IMM GRANULOCYTES # BLD AUTO: 0.01 K/UL (ref 0–0.04)
IMM GRANULOCYTES NFR BLD AUTO: 0.2 % (ref 0–0.5)
LDLC SERPL CALC-MCNC: 130.4 MG/DL (ref 63–159)
LYMPHOCYTES # BLD AUTO: 2.1 K/UL (ref 1–4.8)
LYMPHOCYTES NFR BLD: 46.5 % (ref 18–48)
MCH RBC QN AUTO: 29.6 PG (ref 27–31)
MCHC RBC AUTO-ENTMCNC: 31.7 G/DL (ref 32–36)
MCV RBC AUTO: 94 FL (ref 82–98)
MONOCYTES # BLD AUTO: 0.5 K/UL (ref 0.3–1)
MONOCYTES NFR BLD: 11.1 % (ref 4–15)
NEUTROPHILS # BLD AUTO: 1.7 K/UL (ref 1.8–7.7)
NEUTROPHILS NFR BLD: 37.6 % (ref 38–73)
NONHDLC SERPL-MCNC: 148 MG/DL
NRBC BLD-RTO: 0 /100 WBC
PLATELET # BLD AUTO: 264 K/UL (ref 150–350)
PMV BLD AUTO: 11.3 FL (ref 9.2–12.9)
POTASSIUM SERPL-SCNC: 4.4 MMOL/L (ref 3.5–5.1)
PROT SERPL-MCNC: 7.5 G/DL (ref 6–8.4)
RBC # BLD AUTO: 3.88 M/UL (ref 4–5.4)
SODIUM SERPL-SCNC: 142 MMOL/L (ref 136–145)
TRIGL SERPL-MCNC: 88 MG/DL (ref 30–150)
WBC # BLD AUTO: 4.49 K/UL (ref 3.9–12.7)

## 2019-09-20 PROCEDURE — 80053 COMPREHEN METABOLIC PANEL: CPT

## 2019-09-20 PROCEDURE — 85025 COMPLETE CBC W/AUTO DIFF WBC: CPT

## 2019-09-20 PROCEDURE — 83036 HEMOGLOBIN GLYCOSYLATED A1C: CPT

## 2019-09-20 PROCEDURE — 36415 COLL VENOUS BLD VENIPUNCTURE: CPT | Mod: PO

## 2019-09-20 PROCEDURE — 80061 LIPID PANEL: CPT

## 2019-10-10 ENCOUNTER — OFFICE VISIT (OUTPATIENT)
Dept: FAMILY MEDICINE | Facility: CLINIC | Age: 84
End: 2019-10-10
Attending: FAMILY MEDICINE
Payer: MEDICARE

## 2019-10-10 VITALS
DIASTOLIC BLOOD PRESSURE: 82 MMHG | WEIGHT: 129.06 LBS | BODY MASS INDEX: 27.09 KG/M2 | TEMPERATURE: 97 F | HEART RATE: 77 BPM | HEIGHT: 58 IN | OXYGEN SATURATION: 95 % | SYSTOLIC BLOOD PRESSURE: 134 MMHG

## 2019-10-10 DIAGNOSIS — G47.00 INSOMNIA, UNSPECIFIED TYPE: Primary | ICD-10-CM

## 2019-10-10 PROCEDURE — 1101F PR PT FALLS ASSESS DOC 0-1 FALLS W/OUT INJ PAST YR: ICD-10-PCS | Mod: CPTII,S$GLB,, | Performed by: FAMILY MEDICINE

## 2019-10-10 PROCEDURE — 99999 PR PBB SHADOW E&M-EST. PATIENT-LVL III: CPT | Mod: PBBFAC,,, | Performed by: FAMILY MEDICINE

## 2019-10-10 PROCEDURE — 99999 PR PBB SHADOW E&M-EST. PATIENT-LVL III: ICD-10-PCS | Mod: PBBFAC,,, | Performed by: FAMILY MEDICINE

## 2019-10-10 PROCEDURE — 99213 OFFICE O/P EST LOW 20 MIN: CPT | Mod: S$GLB,,, | Performed by: FAMILY MEDICINE

## 2019-10-10 PROCEDURE — 99213 PR OFFICE/OUTPT VISIT, EST, LEVL III, 20-29 MIN: ICD-10-PCS | Mod: S$GLB,,, | Performed by: FAMILY MEDICINE

## 2019-10-10 PROCEDURE — 1101F PT FALLS ASSESS-DOCD LE1/YR: CPT | Mod: CPTII,S$GLB,, | Performed by: FAMILY MEDICINE

## 2019-10-10 NOTE — PROGRESS NOTES
Subjective:       Patient ID: Vivienne Sexton is a 89 y.o. female.    Chief Complaint: Follow-up    89 y old female with insomnia here for f.u after trial or Remeron . She has not taken it .She forgets . Insomnia remeians      Review of Systems   Constitutional: Negative.    HENT: Negative.    Eyes: Negative.    Respiratory: Negative.    Cardiovascular: Negative.    Gastrointestinal: Negative.    Genitourinary: Negative.    Musculoskeletal: Negative.    Skin: Negative.    Hematological: Negative.        Objective:      Physical Exam   Constitutional: She is oriented to person, place, and time. No distress.   HENT:   Head: Normocephalic and atraumatic.   Right Ear: External ear normal.   Left Ear: External ear normal.   Mouth/Throat: No oropharyngeal exudate.   Eyes: Pupils are equal, round, and reactive to light. Conjunctivae and EOM are normal. Right eye exhibits no discharge. Left eye exhibits no discharge. No scleral icterus.   Neck: Normal range of motion. Neck supple. No JVD present. No tracheal deviation present. No thyromegaly present.   Cardiovascular: Normal rate, regular rhythm and normal heart sounds. Exam reveals no gallop and no friction rub.   No murmur heard.  Pulmonary/Chest: Effort normal and breath sounds normal. No stridor. No respiratory distress. She has no wheezes. She has no rales. She exhibits no tenderness.   Abdominal: Soft. Bowel sounds are normal. She exhibits no distension. There is no tenderness. There is no rebound and no guarding.   Musculoskeletal: Normal range of motion.   Lymphadenopathy:     She has no cervical adenopathy.   Neurological: She is alert and oriented to person, place, and time.   Skin: Skin is warm and dry. She is not diaphoretic.   Psychiatric: She has a normal mood and affect. Her behavior is normal. Judgment and thought content normal.       Assessment:       Vivienne was seen today for follow-up.    Diagnoses and all orders for this visit:    Insomnia,  unspecified type      Plan:    Will have son reminding her to take med   F.u in 2 m   Time spent: 15 minutes in face to face discussion concerning diagnosis, prognosis.Greater than half the time spent was used for coordination of care and counseling of patient.

## 2019-10-24 ENCOUNTER — TELEPHONE (OUTPATIENT)
Dept: RHEUMATOLOGY | Facility: CLINIC | Age: 84
End: 2019-10-24

## 2019-10-28 ENCOUNTER — TELEPHONE (OUTPATIENT)
Dept: RHEUMATOLOGY | Facility: CLINIC | Age: 84
End: 2019-10-28

## 2019-11-04 ENCOUNTER — TELEPHONE (OUTPATIENT)
Dept: RHEUMATOLOGY | Facility: CLINIC | Age: 84
End: 2019-11-04

## 2019-12-03 ENCOUNTER — TELEPHONE (OUTPATIENT)
Dept: ENDOSCOPY | Facility: HOSPITAL | Age: 84
End: 2019-12-03

## 2020-02-13 ENCOUNTER — TELEPHONE (OUTPATIENT)
Dept: FAMILY MEDICINE | Facility: CLINIC | Age: 85
End: 2020-02-13

## 2020-02-13 DIAGNOSIS — R41.3 MEMORY LOSS: Primary | ICD-10-CM

## 2021-02-11 ENCOUNTER — PATIENT OUTREACH (OUTPATIENT)
Dept: ADMINISTRATIVE | Facility: HOSPITAL | Age: 86
End: 2021-02-11

## 2021-11-19 ENCOUNTER — TELEPHONE (OUTPATIENT)
Dept: ADMINISTRATIVE | Facility: HOSPITAL | Age: 86
End: 2021-11-19
Payer: MEDICARE

## 2022-01-27 ENCOUNTER — PES CALL (OUTPATIENT)
Dept: ADMINISTRATIVE | Facility: CLINIC | Age: 87
End: 2022-01-27
Payer: MEDICARE

## 2023-05-29 NOTE — PROGRESS NOTES
"Subjective:       Patient ID: Vivienne Sexton is a 88 y.o. female.    Chief Complaint: Follow-up    HPI Comments: 88-year-old female patient with Patient Active Problem List:     Hypertension     GERD (gastroesophageal reflux disease)     Hard of hearing     Osteoporosis     CKD (chronic kidney disease) stage 3, GFR 30-59 ml/min     Anemia     Primary osteoarthritis of both knees  Here for routine annual physicals.  Patient has been taking her medications regularly, reports that she has not been walking enough.   Patient has received letter that she's due for colonoscopy, previous colonoscopy was in 2011 showing tubular adenomatous polyp.   Patient denies of any trouble with bowel movements, has been having abdominal bloating off and on lately, but has been having regular bowel movements  Denies of any shortness of breath but has been having occasional chest discomfort.   Denies of any heartburn-like symptoms    Review of Systems   Constitutional: Negative for fatigue.   Eyes: Negative for visual disturbance.   Respiratory: Negative for shortness of breath.    Cardiovascular: Negative for chest pain and leg swelling.        Reports chest discomfort but no chest pain   Gastrointestinal: Negative for abdominal pain, constipation, nausea and vomiting.        Positive for abdominal bloating   Musculoskeletal: Positive for arthralgias, back pain and myalgias.   Skin: Negative for rash.   Neurological: Negative for weakness, light-headedness, numbness and headaches.   Psychiatric/Behavioral: Negative for sleep disturbance.         /80  Pulse 66  Temp 96.3 °F (35.7 °C) (Tympanic)   Ht 4' 10" (1.473 m)  Wt 60.4 kg (133 lb 2.5 oz)  SpO2 98%  BMI 27.83 kg/m2  Objective:      Physical Exam   Constitutional: She is oriented to person, place, and time. She appears well-developed and well-nourished.   HENT:   Head: Normocephalic and atraumatic.   Mouth/Throat: Oropharynx is clear and moist.   Cardiovascular: " Normal rate, regular rhythm and normal heart sounds.    No murmur heard.  Pulmonary/Chest: Effort normal and breath sounds normal. She has no wheezes. She exhibits no tenderness.   Abdominal: Soft. Bowel sounds are normal. There is no tenderness.   Musculoskeletal: She exhibits no edema or tenderness.   Neurological: She is alert and oriented to person, place, and time.   Skin: Skin is warm and dry. No rash noted.   Psychiatric: She has a normal mood and affect.         Assessment:       1. Routine general medical examination at a health care facility    2. Essential hypertension    3. CKD (chronic kidney disease) stage 3, GFR 30-59 ml/min    4. Osteoporosis, unspecified osteoporosis type, unspecified pathological fracture presence    5. History of vertebral compression fracture    6. Primary osteoarthritis of both knees    7. Hearing loss, unspecified hearing loss type, unspecified laterality    8. Gastroesophageal reflux disease, esophagitis presence not specified    9. Colon cancer screening        Plan:   Routine general medical examination at a health care facility  Vital signs stable today.  Clinical exam stable.  Encouraged to maintain lifestyle modifications with low-fat and low-cholesterol diet and exercise 30 minutes daily.       Essential hypertension  -     Comprehensive metabolic panel; Future; Expected date: 4/13/17  -     Lipid panel; Future; Expected date: 4/13/17  -     TSH; Future; Expected date: 4/13/17  -     Urinalysis; Future; Expected date: 4/13/17  -     EKG 12-lead; Future  Blood pressure stable today currently taking lisinopril 10 mg and Maxzide 37.5 mg daily  Advised to restrict salt intake and eat low-fat and low-cholesterol diet and exercise 30 minutes daily    CKD (chronic kidney disease) stage 3, GFR 30-59 ml/min  -     CBC auto differential; Future; Expected date: 4/13/17  Encouraged to drink adequate fluids    Osteoporosis, unspecified osteoporosis type, unspecified pathological  fracture presence  -     CBC auto differential; Future; Expected date: 4/13/17  -     Vitamin D; Future; Expected date: 4/13/17  History of vertebral compression fracture  Primary osteoarthritis of both knees  Currently followed by rheumatology currently on prolia  injections      Hearing loss, unspecified hearing loss type, unspecified laterality - STABLE     Gastroesophageal reflux disease, esophagitis presence not specified- stable on omeprazole 40 mg daily     Colon cancer screening  -     Case request GI: COLONOSCOPY  -     sodium,potassium,mag sulfates (SUPREP BOWEL PREP KIT) 17.5-3.13-1.6 gram SolR; Take it as directed  Dispense: 354 mL; Refill: 0  Patient due for colonoscopy with previous history of tubular adenomatous polyp and diverticulosis   secondary to abdominal bloating advised to try taking over-the-counter activia yogurt       No

## 2023-09-12 NOTE — PROGRESS NOTES
[facility] DR MARIN please see med request.LISINOPRIL 10MG 1 TAB DAILY. LAST OV:3/11/19 NO FUTURE APPT. LAST REFILLED: 19 30 TABLETS WITH NO REFILLS. PLEASE CONTACT PT.   Vivienne Sexton             29         MRN [id number]    Ms Sexton only has a 30 day Rx no refills in EPIC for Lisinopril 10mg daily.  Please ask Dr Wright if ok to prescribe 90 day Rx with refills.   Her last fill was 6/20/19 x 30 days.  I did leave her a [facility] on  asking her to refill but it wasnt.    Thanks!!!!  Ghislaine Elliott, RN Navigator, O-BRTeam/Non-SNP      Sitter given new gown, disposable underwear, Blankets and water per pt request.      Debe Cheadle, RN  09/12/23 [id number]

## 2024-02-07 NOTE — TELEPHONE ENCOUNTER
----- Message from Marcelle Modi sent at 11/9/2017  2:46 PM CST -----  Contact: Patient  Patient called to request a refill.    1. What is the name of the medication you are requesting? triamt/HCTZ  2. What is the dose? 37.5/25 mg  3. How do you take the medication? Orally, topically, etc? orally  4. How often do you take this medication? 1 tablet daily  5. Do you need a 30 day or 90 day supply? 90 days  6. How many refills are you requesting? 3  7. What is your preferred pharmacy and location of the pharmacy?     [facility] [address] [facility], [place] - [address]  [address]  [place] [address]  Phone: [phone] Fax: [phone]    8. Who can we contact with further questions? Vivienne [phone].    Thanks,  Marcelle   [Joint Pain] : joint pain [Negative] : Constitutional